# Patient Record
Sex: MALE | Race: WHITE | NOT HISPANIC OR LATINO | URBAN - METROPOLITAN AREA
[De-identification: names, ages, dates, MRNs, and addresses within clinical notes are randomized per-mention and may not be internally consistent; named-entity substitution may affect disease eponyms.]

---

## 2024-08-15 ENCOUNTER — EVALUATION (OUTPATIENT)
Dept: OCCUPATIONAL THERAPY | Facility: CLINIC | Age: 79
End: 2024-08-15
Payer: COMMERCIAL

## 2024-08-15 DIAGNOSIS — R29.898 BILATERAL LEG WEAKNESS: ICD-10-CM

## 2024-08-15 DIAGNOSIS — I89.0 LYMPHEDEMA OF BOTH LOWER EXTREMITIES: Primary | ICD-10-CM

## 2024-08-15 DIAGNOSIS — I89.0 LYMPHEDEMA, NOT ELSEWHERE CLASSIFIED: ICD-10-CM

## 2024-08-15 PROCEDURE — 97166 OT EVAL MOD COMPLEX 45 MIN: CPT | Mod: GO

## 2024-08-15 ASSESSMENT — ENCOUNTER SYMPTOMS
OCCASIONAL FEELINGS OF UNSTEADINESS: 1
LOSS OF SENSATION IN FEET: 1
DEPRESSION: 0

## 2024-08-15 ASSESSMENT — PAIN DESCRIPTION - DESCRIPTORS: DESCRIPTORS: ACHING;DISCOMFORT;HEAVINESS;TIGHTNESS

## 2024-08-15 ASSESSMENT — PAIN SCALES - GENERAL: PAINLEVEL_OUTOF10: 5 - MODERATE PAIN

## 2024-08-15 ASSESSMENT — PAIN - FUNCTIONAL ASSESSMENT: PAIN_FUNCTIONAL_ASSESSMENT: 0-10

## 2024-08-15 NOTE — PROGRESS NOTES
Occupational Therapy    Occupational Therapy Evaluation    Name: Dexter Avalos  MRN: [id number]  : 1945  Date: 08/15/24    Time Entry:                            Assessment:     Plan:       Subjective   Current Problem:  1. Lymphedema of both lower extremities        2. Lymphedema, not elsewhere classified  Referral to Occupational Therapy      3. Bilateral leg weakness          General:   OT Received On: 08/15/24  General  Reason for Referral: lymphedema  Referred By: BARBIE Sanchez  Past Medical History Relevant to Rehab: Pt living at [facility] and rehab after being in ER for a fall.  hx of ulcerations for several years, RLE worse than LLE  -has been admitted multiple times for chronic leg issues and hx of cellulitis.    Has been on antibiotics in the past IV and oral.  -used to see a wound doctor at [facility].   -has seen vascular medicine in the past  General Comment: VISIT 1, AUTH REQUIRED onset 2024 from [facility] dx  Precautions:  Precautions  [facility] Fall Risk Score (The score of 4 or more indicates an increased risk of falling): 8  Precautions Comment: falls risk,  Vital Signs:     Pain Assessment:  Pain Assessment  Pain Assessment: 0-10  0-10 (Numeric) Pain Score: 5 - Moderate pain  Pain Type: Chronic pain  Pain Location: Leg  Pain Orientation: Right, Left  Pain Descriptors: Aching, Discomfort, Heaviness, Tightness  Pain Frequency: Intermittent  Pain Onset: Ongoing    Objective   Cognition:     Cognition Test Scores:     Home Living:  Home Living  Type of Home: Skilled Nursing facility  Lives With: Alone  Home Adaptive Equipment: Walker rolling or standard, Wheelchair-manual  Home Layout: One level  Home Access: Level entry  Bathroom Shower/Tub: Walk-in shower  Bathroom Toilet: Handicapped height  Bathroom Equipment: Grab bars in shower, Shower chair without back  Home Living Comments: staff assist with ADLs  Prior Function Per Pt/Caregiver Report:  Prior Function Per Pt/Caregiver  Report  Level of [place]: Needs assistance with ADLs  Receives Help From: Personal care attendant  ADL Assistance: Needs assistance  Hand Dominance: Right  IADL History:     ADL:     Activity Tolerance:  Activity Tolerance  Endurance: Tolerates less than 10 min exercise with changes in vital signs  Mobility/Transfer: {Mobility/Transfer:63730}  Modalities:     Vision:{Vision:76777}  Sensation:     Strength:     Perception:     Coordination:     Hand Function:     Extremities:   and    Lymphedema Assessment    Lymphedema Assessments:  Lymphedema Assessments: Lower extremities  Right Upper Extremity:     Left Upper Extremity:     UE Skin Appearance/Condition and Girth:     Upper Extremity Evaluation:     Right Lower Extremity:  R Metatarsal (cm): 25 cm  R Heel Y Angle: 37 cm  R Ankle (cm): 31 cm  R 10 cm Above Ankle (cm): 35 cm  R 20 cm Above Ankle (cm): 44.5 cm  R 30 cm Above Ankle (cm): 48.5 cm  R 40 cm Above Ankle (cm): 47 cm  R Knee (cm): 50.5 cm  R 10 cm Above Knee (cm): 0 cm  R 20 cm Above Knee (cm): 0 cm  R 30 cm Above Knee (cm): 0 cm  R 40 cm Above Knee (cm): 0 cm  R 50 cm Above Knee (cm): 0 cm  Right lower extremity total: 318.5  Left Lower Extremity:  L Metatarsal (cm): 23.5 cm  L Heel Y Angle: 36 cm  L Ankle (cm): 28.6 cm  L 10 cm Above Ankle (cm): 34 cm  L 20 cm Above Ankle (cm): 43 cm  L 30 cm Above Ankle (cm): 47.5 cm  L 40 cm Above Ankle (cm): 46.4 cm  L Knee (cm): 47 cm  L 10 cm Above Knee (cm): 0 cm  L 20 cm Above Knee (cm): 0 cm  L 30 cm Above Knee (cm): 0 cm  L 40 cm Above Knee (cm): 0 cm  L 50 cm Above Knee (cm): 0 cm  Left Lower extremity total: 306  LE Skin Appearance/Condition and Girth:  Other (comment): R 45 cm  length, L 45 cm length  Lower Extremity Evaluation:     Head and Neck Measurements:     Head and Neck Appearance:     Trunk Skin Appearance/Condition and Girth:      Genital Skin Appearance/Condition and Girth:     Home Living:     ADL:     Prior Function:  Level of [place]:  Needs assistance with ADLs  Receives Help From: Personal care attendant  ADL Assistance: Needs assistance  Hand Dominance: Right  Pain Assessment:  Pain Assessment: 0-10  0-10 (Numeric) Pain Score: 5 - Moderate pain  Pain Type: Chronic pain  Pain Location: Leg  Pain Orientation: Right, Left  Pain Descriptors: Aching, Discomfort, Heaviness, Tightness  Pain Frequency: Intermittent  Pain Onset: Ongoing  Therapy Precautions:  STE[facility]I Fall Risk Score (The score of 4 or more indicates an increased risk of falling): 8  Precautions Comment: falls risk,      Outcome Measures:       OP EDUCATION:       Goals:

## 2024-08-16 NOTE — PROGRESS NOTES
Occupational Therapy    Occupational Therapy Evaluation    Name: Dexter Avalos  MRN: [id number]  : 1945  Date: 24    Time Entry:  Time Calculation  Start Time: 1006  Stop Time: 1055  Time Calculation (min): 49 min  OT Evaluation Time Entry  OT Evaluation (Moderate) Time Entry: 49                      Assessment:  Pt is 79  year old with secondary lymphedema stage 3 with BLEs. Pt resident at SNF with recent venous ulcers with wound care.  Wounds closed at time of eval.  PT wheelchair use in facility with ambulation.   FFW with staff assist in room, Pt reporting pain in BLEs , requires staff assist for ADLs.  I with feeding, mod A for transfer at time of eval.  Rec lymphedema therapy to address proper fitting garments, caregiver education and instruction for use of garments and home management of lymphedema condition, lymphedema pump,    OT Assessment  OT Assessment Results: Decreased functional mobility, Decreased ADL status, Decreased endurance  Plan:  Intervention plan includes: compression bandaging, education/instruction, garment measuring/fitting, home program, manual therapy, therapeutic activities, therapeutic exercises      Pt is phase 1 of secondary lymphedema stage 3, will require decongestive therapy and will order 2 [place] Strong legpiece medium tall, 2 farrow strong foot piece medium long 20-50 mmhg   Outpatient Plan  Frequency: 1x/week  Duration: 6 weeks  Rehab Potential: Good  Plan of Care Agreement: Patient    Subjective   Current Problem:  1. Lymphedema of both lower extremities  Follow Up In Occupational Therapy      2. Lymphedema, not elsewhere classified  Referral to Occupational Therapy    Follow Up In Occupational Therapy      3. Bilateral leg weakness  Follow Up In Occupational Therapy        General:   OT Received On: 08/15/24  General  Reason for Referral: lymphedema  Referred By: BARBIE Sanchez  Past Medical History Relevant to Rehab: Pt living at [facility] and rehab after  being in ER for a fall.  hx of ulcerations for several years, RLE worse than LLE  -has been admitted multiple times for chronic leg issues and hx of cellulitis.    Has been on antibiotics in the past IV and oral.  -used to see a wound doctor at [facility].   -has seen vascular medicine in the past  General Comment: VISIT 1, AUTH REQUIRED onset 4/8/2024 from [facility] dx  Precautions:  Precautions  [facility] Fall Risk Score (The score of 4 or more indicates an increased risk of falling): 8  Precautions Comment: falls risk,  Vital Signs:     Pain Assessment:  Pain Assessment  Pain Assessment: 0-10  0-10 (Numeric) Pain Score: 5 - Moderate pain  Pain Type: Chronic pain  Pain Location: Leg  Pain Orientation: Right, Left  Pain Descriptors: Aching, Discomfort, Heaviness, Tightness  Pain Frequency: Intermittent  Pain Onset: Ongoing    Objective   Cognition:     Cognition Test Scores:     Home Living:  Home Living  Type of Home: Skilled Nursing facility  Lives With: Alone  Home Adaptive Equipment: Walker rolling or standard, Wheelchair-manual  Home Layout: One level  Home Access: Level entry  Bathroom Shower/Tub: Walk-in shower  Bathroom Toilet: Handicapped height  Bathroom Equipment: Grab bars in shower, Shower chair without back  Home Living Comments: staff assist with ADLs  Prior Function Per Pt/Caregiver Report:  Prior Function Per Pt/Caregiver Report  Level of [place]: Needs assistance with ADLs, Needs assistance with functional transfers  Receives Help From:  (facilty staff)  ADL Assistance:  (Pt reporting staff set up and assist for falls prevention for bathing)  Leisure: social participation with family  Hand Dominance: Right  IADL History:     ADL:     Activity Tolerance:  Activity Tolerance  Endurance: Tolerates less than 10 min exercise with changes in vital signs     Extremities:  BLE ROM with stiffness due to wheelchair use and wound care   BLE MMT 3/5 as evidence by functional mobility     Lymphedema  Assessment    Lymphedema Assessments:   Right Lower Extremity Lymphedema  R Metatarsal (cm): 25 cm  R Heel Y Angle: 37 cm  R Ankle (cm): 31 cm  R 10 cm Above Ankle (cm): 35 cm  R 20 cm Above Ankle (cm): 44.5 cm  R 30 cm Above Ankle (cm): 48.5 cm  R 40 cm Above Ankle (cm): 47 cm  R Knee (cm): 50.5 cm  R 10 cm Above Knee (cm): 0 cm  R 20 cm Above Knee (cm): 0 cm  R 30 cm Above Knee (cm): 0 cm  R 40 cm Above Knee (cm): 0 cm  R 50 cm Above Knee (cm): 0 cm  Right lower extremity total: 318.5    L Metatarsal (cm): 23.5 cm  L Heel Y Angle: 36 cm  L Ankle (cm): 28.6 cm  L 10 cm Above Ankle (cm): 34 cm  L 20 cm Above Ankle (cm): 43 cm  L 30 cm Above Ankle (cm): 47.5 cm  L 40 cm Above Ankle (cm): 46.4 cm  L Knee (cm): 47 cm  L 10 cm Above Knee (cm): 0 cm  L 20 cm Above Knee (cm): 0 cm  L 30 cm Above Knee (cm): 0 cm  L 40 cm Above Knee (cm): 0 cm  L 50 cm Above Knee (cm): 0 cm  Left Lower extremity total: 306Left Lower extremity total: 306  Right lower extremity total: 318.5     Pt skin appearance positive for fibrosis, pitting, hemosiderin staining,  hyperpigmentation, hyperkeratosis, papillomas, + stemmer sign,       Outcome Measures:       OP EDUCATION:  Education  Individual(s) Educated: Patient  Education Provided: Lymphedema  Home Program: Handout issued  Risk and Benefits Discussed with Patient/Caregiver/Other: yes  Patient/Caregiver Demonstrated Understanding: yes  Plan of Care Discussed and Agreed Upon: yes  Patient Response to Education: Patient/Caregiver Verbalized Understanding of Information    Goals:  Active       Lymphedema       Pt will I perform skin care for infection prevention and integrity of skin        Start:  08/16/24    Expected End:  11/29/24            Pt and caregivers will demo I with donning/doffing medical compression garments and schedule of proper compression 100% of the time       Start:  08/16/24    Expected End:  11/29/24            Pt will be I with stating and demonstrating home exercise  program in order to improve patients ability to perform self-care, and/or leisure tasks.        Start:  08/16/24    Expected End:  11/29/24            Pt will demo 2-5%  cm  of volume reduction with BLE in order for proper fitting of medical compression garments and increase ease in transfers        Start:  08/16/24    Expected End:  11/29/24            Pt will improve functional transfers for increased I to assist with ADLs and garment and pump management.         Start:  08/16/24    Expected End:  11/29/24

## 2024-09-12 ENCOUNTER — APPOINTMENT (OUTPATIENT)
Dept: CARDIOLOGY | Facility: HOSPITAL | Age: 79
End: 2024-09-12
Payer: COMMERCIAL

## 2024-09-12 ENCOUNTER — APPOINTMENT (OUTPATIENT)
Dept: RADIOLOGY | Facility: HOSPITAL | Age: 79
End: 2024-09-12
Payer: COMMERCIAL

## 2024-09-12 ENCOUNTER — HOSPITAL ENCOUNTER (EMERGENCY)
Facility: HOSPITAL | Age: 79
Discharge: HOME | End: 2024-09-12
Payer: COMMERCIAL

## 2024-09-12 VITALS
RESPIRATION RATE: 18 BRPM | OXYGEN SATURATION: 98 % | TEMPERATURE: 97.8 F | SYSTOLIC BLOOD PRESSURE: 124 MMHG | WEIGHT: 296.4 LBS | DIASTOLIC BLOOD PRESSURE: 62 MMHG | HEART RATE: 87 BPM

## 2024-09-12 DIAGNOSIS — R53.1 WEAKNESS: Primary | ICD-10-CM

## 2024-09-12 LAB
ANION GAP SERPL CALC-SCNC: 8 MMOL/L
APPEARANCE UR: CLEAR
BASOPHILS # BLD AUTO: 0.04 X10*3/UL (ref 0–0.1)
BASOPHILS NFR BLD AUTO: 0.5 %
BILIRUB UR STRIP.AUTO-MCNC: NEGATIVE MG/DL
BUN SERPL-MCNC: 24 MG/DL (ref 8–25)
CALCIUM SERPL-MCNC: 8.6 MG/DL (ref 8.5–10.4)
CHLORIDE SERPL-SCNC: 104 MMOL/L (ref 97–107)
CO2 SERPL-SCNC: 28 MMOL/L (ref 24–31)
COLOR UR: COLORLESS
CREAT SERPL-MCNC: 1.4 MG/DL (ref 0.4–1.6)
EGFRCR SERPLBLD CKD-EPI 2021: 50 ML/MIN/1.73M*2
EOSINOPHIL # BLD AUTO: 0.12 X10*3/UL (ref 0–0.4)
EOSINOPHIL NFR BLD AUTO: 1.6 %
ERYTHROCYTE [DISTWIDTH] IN BLOOD BY AUTOMATED COUNT: 15.1 % (ref 11.5–14.5)
GLUCOSE SERPL-MCNC: 88 MG/DL (ref 65–99)
GLUCOSE UR STRIP.AUTO-MCNC: NORMAL MG/DL
HCT VFR BLD AUTO: 34 % (ref 41–52)
HGB BLD-MCNC: 10.9 G/DL (ref 13.5–17.5)
IMM GRANULOCYTES # BLD AUTO: 0.1 X10*3/UL (ref 0–0.5)
IMM GRANULOCYTES NFR BLD AUTO: 1.4 % (ref 0–0.9)
KETONES UR STRIP.AUTO-MCNC: NEGATIVE MG/DL
LEUKOCYTE ESTERASE UR QL STRIP.AUTO: NEGATIVE
LYMPHOCYTES # BLD AUTO: 0.92 X10*3/UL (ref 0.8–3)
LYMPHOCYTES NFR BLD AUTO: 12.4 %
MCH RBC QN AUTO: 32.2 PG (ref 26–34)
MCHC RBC AUTO-ENTMCNC: 32.1 G/DL (ref 32–36)
MCV RBC AUTO: 101 FL (ref 80–100)
MONOCYTES # BLD AUTO: 1.04 X10*3/UL (ref 0.05–0.8)
MONOCYTES NFR BLD AUTO: 14.1 %
NEUTROPHILS # BLD AUTO: 5.17 X10*3/UL (ref 1.6–5.5)
NEUTROPHILS NFR BLD AUTO: 70 %
NITRITE UR QL STRIP.AUTO: NEGATIVE
NRBC BLD-RTO: 0 /100 WBCS (ref 0–0)
PH UR STRIP.AUTO: 7 [PH]
PLATELET # BLD AUTO: 253 X10*3/UL (ref 150–450)
POTASSIUM SERPL-SCNC: 4 MMOL/L (ref 3.4–5.1)
PROT UR STRIP.AUTO-MCNC: NEGATIVE MG/DL
RBC # BLD AUTO: 3.38 X10*6/UL (ref 4.5–5.9)
RBC # UR STRIP.AUTO: NEGATIVE /UL
SARS-COV-2 RNA RESP QL NAA+PROBE: NOT DETECTED
SODIUM SERPL-SCNC: 140 MMOL/L (ref 133–145)
SP GR UR STRIP.AUTO: 1.01
UROBILINOGEN UR STRIP.AUTO-MCNC: NORMAL MG/DL
WBC # BLD AUTO: 7.4 X10*3/UL (ref 4.4–11.3)

## 2024-09-12 PROCEDURE — 70450 CT HEAD/BRAIN W/O DYE: CPT

## 2024-09-12 PROCEDURE — 80048 BASIC METABOLIC PNL TOTAL CA: CPT

## 2024-09-12 PROCEDURE — 99285 EMERGENCY DEPT VISIT HI MDM: CPT

## 2024-09-12 PROCEDURE — 70450 CT HEAD/BRAIN W/O DYE: CPT | Performed by: RADIOLOGY

## 2024-09-12 PROCEDURE — 87635 SARS-COV-2 COVID-19 AMP PRB: CPT

## 2024-09-12 PROCEDURE — 93005 ELECTROCARDIOGRAM TRACING: CPT

## 2024-09-12 PROCEDURE — 81003 URINALYSIS AUTO W/O SCOPE: CPT

## 2024-09-12 PROCEDURE — 85025 COMPLETE CBC W/AUTO DIFF WBC: CPT

## 2024-09-12 PROCEDURE — 36415 COLL VENOUS BLD VENIPUNCTURE: CPT

## 2024-09-12 ASSESSMENT — COLUMBIA-SUICIDE SEVERITY RATING SCALE - C-SSRS
1. IN THE PAST MONTH, HAVE YOU WISHED YOU WERE DEAD OR WISHED YOU COULD GO TO SLEEP AND NOT WAKE UP?: NO
6. HAVE YOU EVER DONE ANYTHING, STARTED TO DO ANYTHING, OR PREPARED TO DO ANYTHING TO END YOUR LIFE?: NO
2. HAVE YOU ACTUALLY HAD ANY THOUGHTS OF KILLING YOURSELF?: NO

## 2024-09-12 ASSESSMENT — PAIN SCALES - GENERAL: PAINLEVEL_OUTOF10: 0 - NO PAIN

## 2024-09-12 ASSESSMENT — PAIN - FUNCTIONAL ASSESSMENT: PAIN_FUNCTIONAL_ASSESSMENT: 0-10

## 2024-09-12 NOTE — ED PROVIDER NOTES
HPI   Chief Complaint   Patient presents with    Weakness, Gen     Facility called EMS with stroke like symptoms since this AM. Pt AxOx4 and ambulated for EMS appropriately. Pt denies all complaints at this time.        Patient is an 79-year-old male with past medical history of COPD, DANELLE, lymphedema, HTN, HLD presenting via EMS from [facility] with concerns for possible strokelike symptoms.  Report is that when the patient was awakened by nursing staff, he had a difficult time word finding.  That was the only symptoms the nursing facility had noted.  The patient states that he has been having difficulty word finding for several years.  He also endorses that he was groggy when he first woke up.  He states he has been having progressive memory deficits and is being evaluated for possible Alzheimer's.  States that he was able to get up and ambulate out of bed this morning.  Patient denies fevers, chills, cough, sore throat, runny nose, chest pain, shortness of breath, abdominal pain, nausea, vomiting, diarrhea or urinary complaints.              Patient History   Past Medical History:   Diagnosis Date    Anxiety     Cellulitis and abscess of lower extremity     COPD (chronic obstructive pulmonary disease) (Multi)     Depression     Hypertension     Obesity     DANELLE (obstructive sleep apnea)      History reviewed. No pertinent surgical history.  No family history on file.  Social History     Tobacco Use    Smoking status: Former     Current packs/day: 0.50     Types: Cigarettes    Smokeless tobacco: Never   Substance Use Topics    Alcohol use: Not Currently    Drug use: Not on file       Physical Exam   ED Triage Vitals   Temperature Heart Rate Respirations BP   09/12/24 1100 09/12/24 1100 09/12/24 1100 09/12/24 1105   36.6 °C (97.8 °F) 87 18 124/62      Pulse Ox Temp Source Heart Rate Source Patient Position   09/12/24 1105 09/12/24 1100 09/12/24 1100 --   98 % Oral Monitor       BP Location FiO2 (%)      -- --             Physical Exam  Vitals and nursing note reviewed.   Constitutional:       Appearance: He is well-developed.      Comments: Awake, laying in examination bed   HENT:      Head: Normocephalic and atraumatic.      Nose: Nose normal.      Mouth/Throat:      Mouth: Mucous membranes are moist.      Pharynx: Oropharynx is clear.   Eyes:      Extraocular Movements: Extraocular movements intact.      Conjunctiva/sclera: Conjunctivae normal.      Pupils: Pupils are equal, round, and reactive to light.   Cardiovascular:      Rate and Rhythm: Normal rate and regular rhythm.      Pulses: Normal pulses.      Heart sounds: Normal heart sounds. No murmur heard.  Pulmonary:      Effort: Pulmonary effort is normal. No respiratory distress.      Breath sounds: Normal breath sounds.   Abdominal:      General: Abdomen is flat.      Palpations: Abdomen is soft.      Tenderness: There is no abdominal tenderness.   Musculoskeletal:         General: Swelling present. Normal range of motion.      Cervical back: Normal range of motion and neck supple.      Comments: Swelling of bilateral lower extremities, wrapped with Ace wrap.  Consistent with lymphedema   Skin:     General: Skin is warm and dry.      Capillary Refill: Capillary refill takes less than 2 seconds.   Neurological:      General: No focal deficit present.      Mental Status: He is alert and oriented to person, place, and time.   Psychiatric:         Mood and Affect: Mood normal.         Behavior: Behavior normal.           ED Course & MDM   ED Course as of 09/12/24 1428   u Sep 12, 2024   1134 I personally reviewed and interpreted the EKG @1132: NSR 61, no appreciable ischemia, LAD, LAFB, RBBB, 1st deg AVB, prolonged Qtc 467, non-specific TW findings, and no prior EKG available for review. [BC]      ED Course User Index  [BC] Panda Ferrari MD         Diagnoses as of 09/12/24 1428   Weakness                 No data recorded     Ola Coma Scale Score:  15 (09/12/24 1105 : Norah Ontiveros RN)       NIH Stroke Scale: 0 (09/12/24 1105 : Norah Ontiveros RN)                   Medical Decision Making  Patient is an 79-year-old male with past medical history of COPD, DANELLE, lymphedema, HTN, HLD presenting via EMS from a long-term care facility with concerns for possible strokelike symptoms.  NIH of 0.  Test of skew is negative.  There is no cerebral ataxia present.  Lab work, urine, imaging ordered.  Condition considered include but are not limited to: electrolyte abnormality, UTI, intracranial pathology, tired from sleep.     Attending physician was available for consultation on this patient.  CBC is without leukocytosis but does show signs of anemia with hemoglobin 10.9.  BMP without significant [place] abnormality or renal impairment.  UA with micro is negative.  COVID is negative.  Head CT without acute intracranial pathology.    I believe this patient is at low risk for complication, and a disposition of discharge is acceptable.  Return to the Emergency Department if new or worsening symptoms including headache, fever, chills, chest pain, shortness of breath, syncope, near syncope, abdominal pain, nausea, vomiting,  diarrhea, or worsening pain.  Patient is agreeable to a disposition of discharge with follow-up with primary care in the next several days.  Patient will be transported back via ride services.      Portions of this note made with Dragon software, please be mindful of potential grammatical errors.        Labs Reviewed   CBC WITH AUTO DIFFERENTIAL - Abnormal       Result Value    WBC 7.4      nRBC 0.0      RBC 3.38 (*)     Hemoglobin 10.9 (*)     Hematocrit 34.0 (*)      (*)     MCH 32.2      MCHC 32.1      RDW 15.1 (*)     Platelets 253      Neutrophils % 70.0      Immature Granulocytes %, Automated 1.4 (*)     Lymphocytes % 12.4      Monocytes % 14.1      Eosinophils % 1.6      Basophils % 0.5      Neutrophils Absolute 5.17      Immature Granulocytes  Absolute, Automated 0.10      Lymphocytes Absolute 0.92      Monocytes Absolute 1.04 (*)     Eosinophils Absolute 0.12      Basophils Absolute 0.04     BASIC METABOLIC PANEL - Abnormal    Glucose 88      Sodium 140      Potassium 4.0      Chloride 104      Bicarbonate 28      Urea Nitrogen 24      Creatinine 1.40      eGFR 50 (*)     Calcium 8.6      Anion Gap 8     URINALYSIS WITH REFLEX CULTURE AND MICROSCOPIC - Abnormal    Color, Urine Colorless (*)     Appearance, Urine Clear      Specific Gravity, Urine 1.007      pH, Urine 7.0      Protein, Urine NEGATIVE      Glucose, Urine Normal      Blood, Urine NEGATIVE      Ketones, Urine NEGATIVE      Bilirubin, Urine NEGATIVE      Urobilinogen, Urine Normal      Nitrite, Urine NEGATIVE      Leukocyte Esterase, Urine NEGATIVE     SARS-COV-2 PCR - Normal    Coronavirus 2019, PCR Not Detected      Narrative:     This assay has received FDA Emergency Use Authorization (EUA) and is only authorized for the duration of time that circumstances exist to justify the authorization of the emergency use of in vitro diagnostic tests for the detection of SARS-CoV-2 virus and/or diagnosis of COVID-19 infection under section 564(b)(1) of the Act, 21 U.S.C. 360bbb-3(b)(1). This assay is an in vitro diagnostic nucleic acid amplification test for the qualitative detection of SARS-CoV-2 from nasopharyngeal specimens and has been validated for use at [facility]. Negative results do not preclude COVID-19 infections and should not be used as the sole basis for diagnosis, treatment, or other management decisions.     URINALYSIS WITH REFLEX CULTURE AND MICROSCOPIC    Narrative:     The following orders were created for panel order Urinalysis with Reflex Culture and Microscopic.  Procedure                               Abnormality         Status                     ---------                               -----------         ------                     Urinalysis with  Reflex C...[[id number]]  Abnormal            Final result               Extra Urine Gray Tube[[id number]]                                                         Please view results for these tests on the individual orders.   EXTRA URINE GRAY TUBE         CT head wo IV contrast   Final Result   No acute intracranial pathologic findings are identified.   Age-related findings are present.        MACRO:   none        Signed by: Quentin Hudson 9/12/2024 12:47 PM   Dictation workstation:   [id number]            Procedure  Procedures     Ky Coronel PA-C  09/12/24 1425

## 2024-09-13 LAB
ATRIAL RATE: 61 BPM
P AXIS: 37 DEGREES
P OFFSET: 153 MS
P ONSET: 119 MS
PR INTERVAL: 218 MS
Q ONSET: 228 MS
QRS COUNT: 10 BEATS
QRS DURATION: 144 MS
QT INTERVAL: 464 MS
QTC CALCULATION(BAZETT): 467 MS
QTC FREDERICIA: 466 MS
R AXIS: -51 DEGREES
T AXIS: -6 DEGREES
T OFFSET: 460 MS
VENTRICULAR RATE: 61 BPM

## 2024-09-16 ENCOUNTER — DOCUMENTATION (OUTPATIENT)
Dept: OCCUPATIONAL THERAPY | Facility: CLINIC | Age: 79
End: 2024-09-16
Payer: COMMERCIAL

## 2024-09-16 NOTE — PROGRESS NOTES
Occupational Therapy                 Therapy Communication Note    Patient Name: Dexter Avalos  MRN: [id number]  Department:   Room: Room/bed info not found  Today's Date: 9/16/2024     Discipline: Occupational Therapy    Phone call placed to SNF. Spoke with Nita PARIS for explanation of time frame for garments.  Potential self pay for lymphedema pumps due to insurance.  Pt did not communicate today appt with staff so appt not completed this date.      Pt will make next session per DON

## 2024-09-24 ENCOUNTER — TREATMENT (OUTPATIENT)
Dept: OCCUPATIONAL THERAPY | Facility: CLINIC | Age: 79
End: 2024-09-24
Payer: COMMERCIAL

## 2024-09-24 DIAGNOSIS — I89.0 LYMPHEDEMA OF BOTH LOWER EXTREMITIES: ICD-10-CM

## 2024-09-24 DIAGNOSIS — I89.0 LYMPHEDEMA, NOT ELSEWHERE CLASSIFIED: ICD-10-CM

## 2024-09-24 DIAGNOSIS — R29.898 BILATERAL LEG WEAKNESS: ICD-10-CM

## 2024-09-24 PROCEDURE — 97535 SELF CARE MNGMENT TRAINING: CPT | Mod: GO

## 2024-09-24 PROCEDURE — 97110 THERAPEUTIC EXERCISES: CPT | Mod: GO

## 2024-09-24 ASSESSMENT — ACTIVITIES OF DAILY LIVING (ADL): HOME_MANAGEMENT_TIME_ENTRY: 18

## 2024-09-24 NOTE — PROGRESS NOTES
Occupational Therapy    Occupational Therapy Treatment    Name: Dexter Avalos  MRN: [id number]  : 1945  Date: 24    Time Entry:  Time Calculation  Start Time:   Stop Time: 1551  Time Calculation (min): 59 min        OT Therapeutic Procedures Time Entry  Self Care/Home Management (ADLs) Time Entry: 18  Therapeutic Exercise Time Entry: 41                Assessment:  muscle fatigue, lymph flow        Plan: Continue with POC as tolerated, monitor home program, assess new garments,           Subjective   General:  OT Last Visit  OT Received On: 08/15/24  General  Reason for Referral: lymphedema  Referred By: BARBIE Sanchez  General Comment: VISIT 2/10 10V  OT APPROVED 08/15/2024-2024 onset 2024 from [facility] dx  Precautions:  Precautions Comment: falls risk,  Pain Assessment:       Objective            Therapy/Activity: Therapeutic Exercise  Therapeutic Exercise Performed: Yes  Therapeutic Exercise Activity 1: sit<>stand with standing support for balance support and increase muscle pumping  Therapeutic Exercise Activity 2: knee flexoin 20 reps  Therapeutic Exercise Activity 3: knee extension 20 reps  Therapeutic Exercise Activity 4: toe taps and heel raises 20 reps each  Therapeutic Exercise Activity 5: trunk twists 20 reps         Manual Therapy  Manual Therapy Performed: Yes  Manual Therapy Activity 1: girth measurements noted with no change     Lymphedema Assessment    Lymphedema Assessments:  Lymphedema Assessments: Lower extremities     Right Lower Extremity:  R Metatarsal (cm): 25 cm  R Heel Y Angle: 37 cm  R Ankle (cm): 31 cm  R 10 cm Above Ankle (cm): 35 cm  R 20 cm Above Ankle (cm): 44.5 cm  R 30 cm Above Ankle (cm): 48.5 cm  R 40 cm Above Ankle (cm): 47 cm  R Knee (cm): 50.5 cm  R 10 cm Above Knee (cm): 0 cm  R 20 cm Above Knee (cm): 0 cm  R 30 cm Above Knee (cm): 0 cm  R 40 cm Above Knee (cm): 0 cm  R 50 cm Above Knee (cm): 0 cm  Right lower extremity total: 318.5  Left Lower Extremity:  L  Metatarsal (cm): 23.5 cm  L Heel Y Angle: 36 cm  L Ankle (cm): 28.6 cm  L 10 cm Above Ankle (cm): 34 cm  L 20 cm Above Ankle (cm): 43 cm  L 30 cm Above Ankle (cm): 47.5 cm  L 40 cm Above Ankle (cm): 46.4 cm  L Knee (cm): 47 cm  L 10 cm Above Knee (cm): 0 cm  L 20 cm Above Knee (cm): 0 cm  L 30 cm Above Knee (cm): 0 cm  L 40 cm Above Knee (cm): 0 cm  L 50 cm Above Knee (cm): 0 cm  Left Lower extremity total: 306     Therapy Precautions:  Precautions Comment: falls risk,    OP EDUCATION:       Goals:  Active       Lymphedema       Pt will I perform skin care for infection prevention and integrity of skin        Start:  08/16/24    Expected End:  11/29/24            Pt and caregivers will demo I with donning/doffing medical compression garments and schedule of proper compression 100% of the time       Start:  08/16/24    Expected End:  11/29/24            Pt will be I with stating and demonstrating home exercise program in order to improve patients ability to perform self-care, and/or leisure tasks.        Start:  08/16/24    Expected End:  11/29/24            Pt will demo 2-5%  cm  of volume reduction with BLE in order for proper fitting of medical compression garments and increase ease in transfers        Start:  08/16/24    Expected End:  11/29/24            Pt will improve functional transfers for increased I to assist with ADLs and garment and pump management.         Start:  08/16/24    Expected End:  11/29/24

## 2024-10-08 ENCOUNTER — TREATMENT (OUTPATIENT)
Dept: OCCUPATIONAL THERAPY | Facility: CLINIC | Age: 79
End: 2024-10-08
Payer: COMMERCIAL

## 2024-10-08 DIAGNOSIS — I89.0 LYMPHEDEMA OF BOTH LOWER EXTREMITIES: ICD-10-CM

## 2024-10-08 DIAGNOSIS — I89.0 LYMPHEDEMA, NOT ELSEWHERE CLASSIFIED: ICD-10-CM

## 2024-10-08 DIAGNOSIS — R29.898 BILATERAL LEG WEAKNESS: ICD-10-CM

## 2024-10-08 PROCEDURE — 97140 MANUAL THERAPY 1/> REGIONS: CPT | Mod: GO

## 2024-10-08 PROCEDURE — 97530 THERAPEUTIC ACTIVITIES: CPT | Mod: GO,59

## 2024-10-08 ASSESSMENT — PAIN - FUNCTIONAL ASSESSMENT: PAIN_FUNCTIONAL_ASSESSMENT: 0-10

## 2024-10-08 ASSESSMENT — PAIN DESCRIPTION - DESCRIPTORS: DESCRIPTORS: ACHING;HEAVINESS

## 2024-10-08 ASSESSMENT — PAIN SCALES - GENERAL: PAINLEVEL_OUTOF10: 3

## 2024-10-09 NOTE — PROGRESS NOTES
Occupational Therapy    Occupational Therapy Treatment    Name: Dexter Avalos  MRN: [id number]  : 1945  Date: 10/09/24    Time Entry:  Time Calculation  Start Time: 1302  Stop Time: 1352  Time Calculation (min): 50 min        OT Therapeutic Procedures Time Entry  Manual Therapy Time Entry:   Therapeutic Activity Time Entry:                 Assessment:  pump trial, caregiver education      Plan: Continue with POC as tolerated, monitor home program, assess new garments,           Subjective   General:  OT Last Visit  OT Received On: 24  General  Reason for Referral: lymphedema  Referred By: BARBIE Sanchez  General Comment: VISIT 3/10 10V  OT APPROVED 08/15/2024-2024 onset 2024 from [facility] dx  Precautions:  Precautions Comment: falls risk, sc for ambulation,  Pain Assessment:  Pain Assessment  Pain Assessment: 0-10  0-10 (Numeric) Pain Score: 3  Pain Type: Chronic pain  Pain Location: Leg  Pain Orientation: Right, Left, Lower  Pain Descriptors: Aching, Heaviness  Pain Frequency: Intermittent  Pain Onset: Ongoing    Objective       Therapy/Activity:      Therapeutic Activity  Therapeutic Activity Performed: Yes  Therapeutic Activity 1: pump trial completed with assessment for proper mmhg.  Therapeutic Activity 2: caregiver from facility present with instruction and edu on use of pump and donning/doffing leg sleeves, orientation of BL legs during pump trial, proper cleaning for preventoin of infection.  pt and caregiver in agreement  Therapeutic Activity 3: Pt completed 4 weeks of conservative therapy with elevation, exercise, compression sleeve with persistent swelling noted this date.  rec lymphedema pump    Manual Therapy  Manual Therapy Performed: Yes  Manual Therapy Activity 1: girth measurements with no change this date.       Lymphedema Assessment    Lymphedema Assessments:  Lymphedema Assessments: Lower extremities  Right Upper Extremity:     Left Upper Extremity:     UE Skin  Appearance/Condition and Girth:     Upper Extremity Evaluation:     Right Lower Extremity:  R Metatarsal (cm): 25 cm  R Heel Y Angle: 37 cm  R Ankle (cm): 31 cm  R 10 cm Above Ankle (cm): 35 cm  R 20 cm Above Ankle (cm): 44.5 cm  R 30 cm Above Ankle (cm): 48.5 cm  R 40 cm Above Ankle (cm): 47 cm  R Knee (cm): 50.5 cm  R 10 cm Above Knee (cm): 0 cm  R 20 cm Above Knee (cm): 0 cm  R 30 cm Above Knee (cm): 0 cm  R 40 cm Above Knee (cm): 0 cm  R 50 cm Above Knee (cm): 0 cm  Right lower extremity total: 318.5  Left Lower Extremity:  L Metatarsal (cm): 23.5 cm  L Heel Y Angle: 36 cm  L Ankle (cm): 28.6 cm  L 10 cm Above Ankle (cm): 34 cm  L 20 cm Above Ankle (cm): 43 cm  L 30 cm Above Ankle (cm): 47.5 cm  L 40 cm Above Ankle (cm): 46.4 cm  L Knee (cm): 47 cm  L 10 cm Above Knee (cm): 0 cm  L 20 cm Above Knee (cm): 0 cm  L 30 cm Above Knee (cm): 0 cm  L 40 cm Above Knee (cm): 0 cm  L 50 cm Above Knee (cm): 0 cm  Left Lower extremity total: 306  LE Skin Appearance/Condition and Girth:  Dryness: Y  Edema - Fibrotic: Y  Edema - Pitting: Y  Hemosiderin Staining: Y  Hyperkeratosis: Y  Hyperpigmentation: Y  Papillomas: Y  +Stemmer Sign: Y     Pain Assessment:  Pain Assessment: 0-10  0-10 (Numeric) Pain Score: 3  Pain Type: Chronic pain  Pain Location: Leg  Pain Orientation: Right, Left, Lower  Pain Descriptors: Aching, Heaviness  Pain Frequency: Intermittent  Pain Onset: Ongoing  Therapy Precautions:  Precautions Comment: falls risk, sc for ambulation,      OP EDUCATION:       Goals:  Active       Lymphedema       Pt will I perform skin care for infection prevention and integrity of skin        Start:  08/16/24    Expected End:  11/29/24            Pt and caregivers will demo I with donning/doffing medical compression garments and schedule of proper compression 100% of the time       Start:  08/16/24    Expected End:  11/29/24            Pt will be I with stating and demonstrating home exercise program in order to improve  patients ability to perform self-care, and/or leisure tasks.        Start:  08/16/24    Expected End:  11/29/24            Pt will demo 2-5%  cm  of volume reduction with BLE in order for proper fitting of medical compression garments and increase ease in transfers        Start:  08/16/24    Expected End:  11/29/24            Pt will improve functional transfers for increased I to assist with ADLs and garment and pump management.         Start:  08/16/24    Expected End:  11/29/24

## 2024-11-13 ENCOUNTER — TREATMENT (OUTPATIENT)
Dept: OCCUPATIONAL THERAPY | Facility: CLINIC | Age: 79
End: 2024-11-13
Payer: COMMERCIAL

## 2024-11-13 DIAGNOSIS — R29.898 BILATERAL LEG WEAKNESS: ICD-10-CM

## 2024-11-13 DIAGNOSIS — I89.0 LYMPHEDEMA, NOT ELSEWHERE CLASSIFIED: ICD-10-CM

## 2024-11-13 DIAGNOSIS — I89.0 LYMPHEDEMA OF BOTH LOWER EXTREMITIES: ICD-10-CM

## 2024-11-13 PROCEDURE — 97110 THERAPEUTIC EXERCISES: CPT | Mod: GO

## 2024-11-13 PROCEDURE — 97140 MANUAL THERAPY 1/> REGIONS: CPT | Mod: GO

## 2024-11-13 PROCEDURE — 97535 SELF CARE MNGMENT TRAINING: CPT | Mod: GO

## 2024-11-13 ASSESSMENT — ACTIVITIES OF DAILY LIVING (ADL): HOME_MANAGEMENT_TIME_ENTRY: 14

## 2024-11-13 ASSESSMENT — PAIN - FUNCTIONAL ASSESSMENT: PAIN_FUNCTIONAL_ASSESSMENT: 0-10

## 2024-11-13 ASSESSMENT — PAIN SCALES - GENERAL: PAINLEVEL_OUTOF10: 0 - NO PAIN

## 2024-11-13 NOTE — PROGRESS NOTES
Occupational Therapy    Occupational Therapy Treatment    Name: Dexter Avalos  MRN: [id number]  : 1945  Date: 24    Time Entry:  Time Calculation  Start Time: 1502  Stop Time: 1556  Time Calculation (min): 54 min        OT Therapeutic Procedures Time Entry  Manual Therapy Time Entry: 26  Self Care/Home Management (ADLs) Time Entry: 14  Therapeutic Exercise Time Entry: 14                Assessment: muscle fatigue, increased volume noted      Plan:  Pt arrived with no medical compression garments this date.  Phone call placed this AM to Chaya to make sure garments came with pt.  Made second phone call this date when pt arrived. Left voicemail for pt to bring next session in order to advance progress with therapy        Subjective    General:  OT Last Visit  OT Received On: 10/08/24  General  Reason for Referral: lymphedema  Referred By: BARBIE Sanchez  General Comment: VISIT 4/10 10V  OT APPROVED 08/15/2024-2024 onset 2024 from [facility] dx  Precautions:  Precautions Comment: falls risk, sc for ambulation,  Pain Assessment:  Pain Assessment  Pain Assessment: 0-10  0-10 (Numeric) Pain Score: 0 - No pain    Objective       Therapy/Activity: Therapeutic Exercise  Therapeutic Exercise Performed: Yes  Therapeutic Exercise Activity 2: knee flexion 20 reps  Therapeutic Exercise Activity 3: knee extension 20 reps  Therapeutic Exercise Activity 4: heel hikes 20 reps  Therapeutic Exercise Activity 5: toe taps 20 reps    Therapeutic Activity  Therapeutic Activity Performed: Yes  Therapeutic Activity 1: pt completed toileting with ambulating into bathroom with BL canes for support.  pt able to complete on/off toilet with good balance support with use of grab bar.    Manual Therapy  Manual Therapy Performed: Yes  Manual Therapy Activity 1: girth measurements with volume increased noted     Lymphedema Assessment    Lymphedema Assessments:  Lymphedema Assessments: Lower extremities  Right Upper Extremity:     Left Upper  Extremity:     UE Skin Appearance/Condition and Girth:     Upper Extremity Evaluation:     Right Lower Extremity:  R Metatarsal (cm): 26 cm  R Heel Y Angle: 37 cm  R Ankle (cm): 31 cm  R 10 cm Above Ankle (cm): 36 cm  R 20 cm Above Ankle (cm): 45.5 cm  R 30 cm Above Ankle (cm): 50.5 cm  R 40 cm Above Ankle (cm): 51 cm  R Knee (cm): 52 cm  R 10 cm Above Knee (cm): 0 cm  R 20 cm Above Knee (cm): 0 cm  R 30 cm Above Knee (cm): 0 cm  R 40 cm Above Knee (cm): 0 cm  R 50 cm Above Knee (cm): 0 cm  Right lower extremity total: 329  Left Lower Extremity:  L Metatarsal (cm): 25.5 cm  L Heel Y Angle: 37 cm  L Ankle (cm): 28 cm  L 10 cm Above Ankle (cm): 34 cm  L 20 cm Above Ankle (cm): 44 cm  L 30 cm Above Ankle (cm): 48 cm  L 40 cm Above Ankle (cm): 48 cm  L Knee (cm): 51 cm  L 10 cm Above Knee (cm): 0 cm  L 20 cm Above Knee (cm): 0 cm  L 30 cm Above Knee (cm): 0 cm  L 40 cm Above Knee (cm): 0 cm  L 50 cm Above Knee (cm): 0 cm  Left Lower extremity total: 315.5     Pain Assessment:  Pain Assessment: 0-10  0-10 (Numeric) Pain Score: 0 - No pain  Therapy Precautions:  Precautions Comment: falls risk, sc for ambulation,  OP EDUCATION:       Goals:  Active       Lymphedema       Pt will I perform skin care for infection prevention and integrity of skin        Start:  08/16/24    Expected End:  11/29/24            Pt and caregivers will demo I with donning/doffing medical compression garments and schedule of proper compression 100% of the time       Start:  08/16/24    Expected End:  11/29/24            Pt will be I with stating and demonstrating home exercise program in order to improve patients ability to perform self-care, and/or leisure tasks.        Start:  08/16/24    Expected End:  11/29/24            Pt will demo 2-5%  cm  of volume reduction with BLE in order for proper fitting of medical compression garments and increase ease in transfers        Start:  08/16/24    Expected End:  11/29/24            Pt will improve  functional transfers for increased I to assist with ADLs and garment and pump management.         Start:  08/16/24    Expected End:  11/29/24

## 2024-11-15 ENCOUNTER — DOCUMENTATION (OUTPATIENT)
Dept: OCCUPATIONAL THERAPY | Facility: CLINIC | Age: 79
End: 2024-11-15
Payer: COMMERCIAL

## 2024-11-15 NOTE — PROGRESS NOTES
Occupational Therapy                 Therapy Communication Note    Patient Name: Dexter Avalos  MRN: [id number]  Department: [facility]  Room: Room/bed info not found  Today's Date: 11/15/2024     Discipline: Occupational Therapy      Comment:  Phone call place 11/13/2024 morning of pt tx to medical records, Chaya at  to remind facility to bring inelastic velcro garments to appt with staff to learn donning/doffing.   When pt arrived pt did not have garments and no staff present in tx

## 2024-11-15 NOTE — PROGRESS NOTES
Occupational Therapy                 Therapy Communication Note    Patient Name: Dexter Avalos  MRN: [id number]  Department: [facility]  Room: Room/bed info not found  Today's Date: 11/15/2024     Discipline: Occupational Therapy        Comment:  Phone call placed to [facility] arriola on behalf of Dexter and future appt.  With limited tx due to insurance. Pt will require to bring inelastic wraps next session with staff attending.  Otherwise pt will be sent home per okay from Frances Chirinos, manager.

## 2024-11-21 ENCOUNTER — DOCUMENTATION (OUTPATIENT)
Dept: OCCUPATIONAL THERAPY | Facility: CLINIC | Age: 79
End: 2024-11-21
Payer: COMMERCIAL

## 2024-11-21 NOTE — PROGRESS NOTES
Occupational Therapy                 Therapy Communication Note    Patient Name: Dexter Avalos  MRN: [id number]  Department:   Room: Room/bed info not found  Today's Date: 11/21/2024     Discipline: Occupational Therapy    Comment:  Phone call placed to Chaya from Medical records pertaining to received fax number.  Left voicemail for Chaya to please call and make 2-3 future appts as Pt does not have any future appts this date.      One of the appt he must bring in ordered inelastic reduction kits with a staff in attendance in order to be trained on donning/doffing.     Requested chaya to return phone call if any questions.

## 2024-12-09 ENCOUNTER — TREATMENT (OUTPATIENT)
Dept: OCCUPATIONAL THERAPY | Facility: CLINIC | Age: 79
End: 2024-12-09
Payer: COMMERCIAL

## 2024-12-09 DIAGNOSIS — I89.0 LYMPHEDEMA OF BOTH LOWER EXTREMITIES: ICD-10-CM

## 2024-12-09 DIAGNOSIS — I89.0 LYMPHEDEMA, NOT ELSEWHERE CLASSIFIED: ICD-10-CM

## 2024-12-09 DIAGNOSIS — R29.898 BILATERAL LEG WEAKNESS: ICD-10-CM

## 2024-12-09 PROCEDURE — 97140 MANUAL THERAPY 1/> REGIONS: CPT | Mod: GO

## 2024-12-09 PROCEDURE — 97535 SELF CARE MNGMENT TRAINING: CPT | Mod: GO

## 2024-12-09 ASSESSMENT — PAIN - FUNCTIONAL ASSESSMENT: PAIN_FUNCTIONAL_ASSESSMENT: 0-10

## 2024-12-09 ASSESSMENT — ACTIVITIES OF DAILY LIVING (ADL): HOME_MANAGEMENT_TIME_ENTRY: 41

## 2024-12-09 ASSESSMENT — PAIN SCALES - GENERAL: PAINLEVEL_OUTOF10: 0 - NO PAIN

## 2024-12-09 NOTE — PROGRESS NOTES
Occupational Therapy    Occupational Therapy Treatment    Name: Dexter Avalos  MRN: [id number]  : 1945  Date: 24    Time Entry:  Time Calculation  Start Time: 800  Stop Time: 905  Time Calculation (min): 65 min        OT Therapeutic Procedures Time Entry  Manual Therapy Time Entry: 24  Self Care/Home Management (ADLs) Time Entry: 41                Assessment:  garment fitting, edu to pt with poor carry over with verbalizing instructions back.       Plan: called [facility] to provide edu that instructions are with pt to be followed.  Faxed over information to  and [facility] this date.         Subjective   General:  OT Last Visit  OT Received On: 24  General  Reason for Referral: lymphedema  Referred By: BARBIE Sanchez  General Comment: VISIT 5/10 10  OT APPROVED 08/15/2024-2024 onset 2024 from [facility] dx  Precautions:  Precautions Comment: falls risk, sc for ambulation,  Pain Assessment:  Pain Assessment  Pain Assessment: 0-10  0-10 (Numeric) Pain Score: 0 - No pain    Objective       Therapy/Activity:      Therapeutic Activity  Therapeutic Activity Performed: Yes  Therapeutic Activity 1: completed wound care on L foot top of foot with mascerated skin on top of area.  applied gauze and kerlex  Therapeutic Activity 2: Pt arrived with medical compression inelastic garments with no staff from facility at meeting for training on donning/doffing and wear schedule. provided handout with written instructions for staff to follow. pt took with him    Manual Therapy  Manual Therapy Performed: Yes  Manual Therapy Activity 1: girth measurements with volume increased noted in BLEs.  L foot with continued weeping noted on top of foot     Lymphedema Assessment    Lymphedema Assessments:  Lymphedema Assessments: Lower extremities  Right Upper Extremity:     Left Upper Extremity:     UE Skin Appearance/Condition and Girth:     Upper Extremity Evaluation:     Right Lower Extremity:  R Metatarsal (cm): 27 cm  R Heel Y Angle:  39 cm  R Ankle (cm): 33 cm  R 10 cm Above Ankle (cm): 39 cm  R 20 cm Above Ankle (cm): 50 cm  R 30 cm Above Ankle (cm): 54 cm  R 40 cm Above Ankle (cm): 53 cm  R Knee (cm): 54.5 cm  R 10 cm Above Knee (cm): 0 cm  R 20 cm Above Knee (cm): 0 cm  R 30 cm Above Knee (cm): 0 cm  R 40 cm Above Knee (cm): 0 cm  R 50 cm Above Knee (cm): 0 cm  Right lower extremity total: 349.5  Left Lower Extremity:  L Metatarsal (cm): 27.5 cm  L Heel Y Angle: 40 cm  L Ankle (cm): 29.5 cm  L 10 cm Above Ankle (cm): 38.5 cm  L 20 cm Above Ankle (cm): 47 cm  L 30 cm Above Ankle (cm): 52 cm  L 40 cm Above Ankle (cm): 51 cm  L Knee (cm): 54 cm  L 10 cm Above Knee (cm): 0 cm  L 20 cm Above Knee (cm): 0 cm  L 30 cm Above Knee (cm): 0 cm  L 40 cm Above Knee (cm): 0 cm  L 50 cm Above Knee (cm): 0 cm  Left Lower extremity total: 339.5  LE Skin Appearance/Condition and Girth:  Edema - Fibrotic: Y  Edema - Pitting: Y  Hemosiderin Staining: Y  Hyperkeratosis: Y  Hyperpigmentation: Y  Papillomas: Y  +Stemmer Sign: Y  Trophic Changes: Y     Pain Assessment:  Pain Assessment: 0-10  0-10 (Numeric) Pain Score: 0 - No pain  Therapy Precautions:  Precautions Comment: falls risk, sc for ambulation,    OP EDUCATION:       Goals:  Active       Lymphedema       Pt will I perform skin care for infection prevention and integrity of skin  (Not Progressing)       Start:  08/16/24    Expected End:  12/20/24            Pt and caregivers will demo I with donning/doffing medical compression garments and schedule of proper compression 100% of the time (Not Progressing)       Start:  08/16/24    Expected End:  12/20/24            Pt will be I with stating and demonstrating home exercise program in order to improve patients ability to perform self-care, and/or leisure tasks.  (Progressing)       Start:  08/16/24    Expected End:  12/20/24            Pt will demo 2-5%  cm  of volume reduction with BLE in order for proper fitting of medical compression garments and increase  ease in transfers  (Not Progressing)       Start:  08/16/24    Expected End:  12/20/24            Pt will improve functional transfers for increased I to assist with ADLs and garment and pump management.   (Progressing)       Start:  08/16/24    Expected End:  12/20/24

## 2025-06-09 ENCOUNTER — APPOINTMENT (OUTPATIENT)
Dept: RADIOLOGY | Facility: HOSPITAL | Age: 80
DRG: 593 | End: 2025-06-09
Payer: COMMERCIAL

## 2025-06-09 ENCOUNTER — HOSPITAL ENCOUNTER (INPATIENT)
Facility: HOSPITAL | Age: 80
LOS: 4 days | Discharge: NURSING, REHAB OR LONG-TERM CARE | DRG: 593 | End: 2025-06-13
Attending: EMERGENCY MEDICINE | Admitting: INTERNAL MEDICINE
Payer: COMMERCIAL

## 2025-06-09 DIAGNOSIS — L03.115 CELLULITIS OF RIGHT LEG: ICD-10-CM

## 2025-06-09 DIAGNOSIS — S31.809A WOUND OF BUTTOCK, UNSPECIFIED LATERALITY, INITIAL ENCOUNTER: Primary | ICD-10-CM

## 2025-06-09 DIAGNOSIS — J41.8 MIXED SIMPLE AND MUCOPURULENT CHRONIC BRONCHITIS (MULTI): ICD-10-CM

## 2025-06-09 LAB
ALBUMIN SERPL BCP-MCNC: 3.3 G/DL (ref 3.4–5)
ALP SERPL-CCNC: 48 U/L (ref 33–136)
ALT SERPL W P-5'-P-CCNC: 6 U/L (ref 10–52)
ANION GAP SERPL CALCULATED.3IONS-SCNC: 10 MMOL/L (ref 10–20)
APPEARANCE UR: CLEAR
AST SERPL W P-5'-P-CCNC: 15 U/L (ref 9–39)
BASOPHILS # BLD AUTO: 0.03 X10*3/UL (ref 0–0.1)
BASOPHILS NFR BLD AUTO: 0.4 %
BILIRUB SERPL-MCNC: 0.4 MG/DL (ref 0–1.2)
BILIRUB UR STRIP.AUTO-MCNC: NEGATIVE MG/DL
BUN SERPL-MCNC: 26 MG/DL (ref 6–23)
CALCIUM SERPL-MCNC: 8.4 MG/DL (ref 8.6–10.3)
CHLORIDE SERPL-SCNC: 104 MMOL/L (ref 98–107)
CO2 SERPL-SCNC: 29 MMOL/L (ref 21–32)
COLOR UR: NORMAL
CREAT SERPL-MCNC: 1.33 MG/DL (ref 0.5–1.3)
EGFRCR SERPLBLD CKD-EPI 2021: 54 ML/MIN/1.73M*2
EOSINOPHIL # BLD AUTO: 0.17 X10*3/UL (ref 0–0.4)
EOSINOPHIL NFR BLD AUTO: 2.3 %
ERYTHROCYTE [DISTWIDTH] IN BLOOD BY AUTOMATED COUNT: 16.9 % (ref 11.5–14.5)
GLUCOSE SERPL-MCNC: 96 MG/DL (ref 74–99)
GLUCOSE UR STRIP.AUTO-MCNC: NORMAL MG/DL
HCT VFR BLD AUTO: 33.2 % (ref 41–52)
HGB BLD-MCNC: 10.4 G/DL (ref 13.5–17.5)
IMM GRANULOCYTES # BLD AUTO: 0.08 X10*3/UL (ref 0–0.5)
IMM GRANULOCYTES NFR BLD AUTO: 1.1 % (ref 0–0.9)
KETONES UR STRIP.AUTO-MCNC: NEGATIVE MG/DL
LACTATE SERPL-SCNC: 0.8 MMOL/L (ref 0.4–2)
LEUKOCYTE ESTERASE UR QL STRIP.AUTO: NEGATIVE
LYMPHOCYTES # BLD AUTO: 0.86 X10*3/UL (ref 0.8–3)
LYMPHOCYTES NFR BLD AUTO: 11.8 %
MCH RBC QN AUTO: 30.8 PG (ref 26–34)
MCHC RBC AUTO-ENTMCNC: 31.3 G/DL (ref 32–36)
MCV RBC AUTO: 98 FL (ref 80–100)
MONOCYTES # BLD AUTO: 0.83 X10*3/UL (ref 0.05–0.8)
MONOCYTES NFR BLD AUTO: 11.4 %
NEUTROPHILS # BLD AUTO: 5.32 X10*3/UL (ref 1.6–5.5)
NEUTROPHILS NFR BLD AUTO: 73 %
NITRITE UR QL STRIP.AUTO: NEGATIVE
NRBC BLD-RTO: 0 /100 WBCS (ref 0–0)
PH UR STRIP.AUTO: 7.5 [PH]
PLATELET # BLD AUTO: 281 X10*3/UL (ref 150–450)
POTASSIUM SERPL-SCNC: 3.7 MMOL/L (ref 3.5–5.3)
PROT SERPL-MCNC: 6.6 G/DL (ref 6.4–8.2)
PROT UR STRIP.AUTO-MCNC: NORMAL MG/DL
RBC # BLD AUTO: 3.38 X10*6/UL (ref 4.5–5.9)
RBC # UR STRIP.AUTO: NEGATIVE MG/DL
RBC #/AREA URNS AUTO: NORMAL /HPF
SODIUM SERPL-SCNC: 139 MMOL/L (ref 136–145)
SP GR UR STRIP.AUTO: 1.03
SQUAMOUS #/AREA URNS AUTO: NORMAL /HPF
UROBILINOGEN UR STRIP.AUTO-MCNC: NORMAL MG/DL
WBC # BLD AUTO: 7.3 X10*3/UL (ref 4.4–11.3)
WBC #/AREA URNS AUTO: NORMAL /HPF

## 2025-06-09 PROCEDURE — 85025 COMPLETE CBC W/AUTO DIFF WBC: CPT | Performed by: EMERGENCY MEDICINE

## 2025-06-09 PROCEDURE — 81001 URINALYSIS AUTO W/SCOPE: CPT | Performed by: INTERNAL MEDICINE

## 2025-06-09 PROCEDURE — 2550000001 HC RX 255 CONTRASTS: Performed by: EMERGENCY MEDICINE

## 2025-06-09 PROCEDURE — 83605 ASSAY OF LACTIC ACID: CPT | Performed by: EMERGENCY MEDICINE

## 2025-06-09 PROCEDURE — 2500000004 HC RX 250 GENERAL PHARMACY W/ HCPCS (ALT 636 FOR OP/ED): Performed by: INTERNAL MEDICINE

## 2025-06-09 PROCEDURE — 96366 THER/PROPH/DIAG IV INF ADDON: CPT

## 2025-06-09 PROCEDURE — 96368 THER/DIAG CONCURRENT INF: CPT

## 2025-06-09 PROCEDURE — 2500000004 HC RX 250 GENERAL PHARMACY W/ HCPCS (ALT 636 FOR OP/ED): Performed by: EMERGENCY MEDICINE

## 2025-06-09 PROCEDURE — 2500000001 HC RX 250 WO HCPCS SELF ADMINISTERED DRUGS (ALT 637 FOR MEDICARE OP): Performed by: INTERNAL MEDICINE

## 2025-06-09 PROCEDURE — 74177 CT ABD & PELVIS W/CONTRAST: CPT

## 2025-06-09 PROCEDURE — 36415 COLL VENOUS BLD VENIPUNCTURE: CPT | Performed by: EMERGENCY MEDICINE

## 2025-06-09 PROCEDURE — 99285 EMERGENCY DEPT VISIT HI MDM: CPT | Mod: 25 | Performed by: EMERGENCY MEDICINE

## 2025-06-09 PROCEDURE — 97161 PT EVAL LOW COMPLEX 20 MIN: CPT | Mod: GP

## 2025-06-09 PROCEDURE — 97165 OT EVAL LOW COMPLEX 30 MIN: CPT | Mod: GO

## 2025-06-09 PROCEDURE — 2500000002 HC RX 250 W HCPCS SELF ADMINISTERED DRUGS (ALT 637 FOR MEDICARE OP, ALT 636 FOR OP/ED): Performed by: INTERNAL MEDICINE

## 2025-06-09 PROCEDURE — 1100000001 HC PRIVATE ROOM DAILY

## 2025-06-09 PROCEDURE — 80053 COMPREHEN METABOLIC PANEL: CPT | Performed by: EMERGENCY MEDICINE

## 2025-06-09 PROCEDURE — 97530 THERAPEUTIC ACTIVITIES: CPT | Mod: GP

## 2025-06-09 PROCEDURE — 74177 CT ABD & PELVIS W/CONTRAST: CPT | Mod: FOREIGN READ | Performed by: RADIOLOGY

## 2025-06-09 PROCEDURE — 96365 THER/PROPH/DIAG IV INF INIT: CPT

## 2025-06-09 PROCEDURE — 96367 TX/PROPH/DG ADDL SEQ IV INF: CPT

## 2025-06-09 PROCEDURE — 87040 BLOOD CULTURE FOR BACTERIA: CPT | Mod: TRILAB | Performed by: EMERGENCY MEDICINE

## 2025-06-09 RX ORDER — NYSTATIN 100000 [USP'U]/G
1 POWDER TOPICAL 2 TIMES DAILY
Status: DISCONTINUED | OUTPATIENT
Start: 2025-06-09 | End: 2025-06-13 | Stop reason: HOSPADM

## 2025-06-09 RX ORDER — ACETAMINOPHEN 650 MG/1
650 SUPPOSITORY RECTAL EVERY 4 HOURS PRN
Status: DISCONTINUED | OUTPATIENT
Start: 2025-06-09 | End: 2025-06-09

## 2025-06-09 RX ORDER — BUPROPION HYDROCHLORIDE 100 MG/1
100 TABLET, EXTENDED RELEASE ORAL 2 TIMES DAILY
COMMUNITY

## 2025-06-09 RX ORDER — FUROSEMIDE 20 MG/1
20 TABLET ORAL
COMMUNITY

## 2025-06-09 RX ORDER — CEFEPIME HYDROCHLORIDE 2 G/50ML
2 INJECTION, SOLUTION INTRAVENOUS ONCE
Status: COMPLETED | OUTPATIENT
Start: 2025-06-09 | End: 2025-06-09

## 2025-06-09 RX ORDER — ONDANSETRON 8 MG/1
8 TABLET, FILM COATED ORAL EVERY 8 HOURS PRN
COMMUNITY

## 2025-06-09 RX ORDER — CEFEPIME HYDROCHLORIDE 2 G/50ML
2 INJECTION, SOLUTION INTRAVENOUS 3 TIMES DAILY
Status: DISCONTINUED | OUTPATIENT
Start: 2025-06-09 | End: 2025-06-12

## 2025-06-09 RX ORDER — PNV NO.95/FERROUS FUM/FOLIC AC 28MG-0.8MG
100 TABLET ORAL DAILY
COMMUNITY

## 2025-06-09 RX ORDER — OXYBUTYNIN CHLORIDE 5 MG/1
5 TABLET ORAL 2 TIMES DAILY
Status: DISCONTINUED | OUTPATIENT
Start: 2025-06-09 | End: 2025-06-13 | Stop reason: HOSPADM

## 2025-06-09 RX ORDER — HYDRALAZINE HYDROCHLORIDE 25 MG/1
25 TABLET, FILM COATED ORAL 3 TIMES DAILY
COMMUNITY

## 2025-06-09 RX ORDER — LORATADINE 10 MG
10 TABLET,DISINTEGRATING ORAL DAILY
COMMUNITY

## 2025-06-09 RX ORDER — POLYETHYLENE GLYCOL 3350 17 G/17G
17 POWDER, FOR SOLUTION ORAL DAILY
COMMUNITY

## 2025-06-09 RX ORDER — ADHESIVE BANDAGE
30 BANDAGE TOPICAL DAILY PRN
Status: DISCONTINUED | OUTPATIENT
Start: 2025-06-09 | End: 2025-06-13 | Stop reason: HOSPADM

## 2025-06-09 RX ORDER — ACETAMINOPHEN 160 MG/5ML
650 SOLUTION ORAL EVERY 4 HOURS PRN
Status: DISCONTINUED | OUTPATIENT
Start: 2025-06-09 | End: 2025-06-13 | Stop reason: HOSPADM

## 2025-06-09 RX ORDER — ACETAMINOPHEN 325 MG/1
650 TABLET ORAL EVERY 4 HOURS PRN
Status: DISCONTINUED | OUTPATIENT
Start: 2025-06-09 | End: 2025-06-13 | Stop reason: HOSPADM

## 2025-06-09 RX ORDER — ONDANSETRON 4 MG/1
4 TABLET, ORALLY DISINTEGRATING ORAL EVERY 8 HOURS PRN
Status: DISCONTINUED | OUTPATIENT
Start: 2025-06-09 | End: 2025-06-13 | Stop reason: HOSPADM

## 2025-06-09 RX ORDER — NYSTATIN 100000 [USP'U]/G
1 POWDER TOPICAL 2 TIMES DAILY
COMMUNITY

## 2025-06-09 RX ORDER — VANCOMYCIN 1.5 G/300ML
1500 INJECTION, SOLUTION INTRAVENOUS EVERY 24 HOURS
Status: DISCONTINUED | OUTPATIENT
Start: 2025-06-10 | End: 2025-06-10

## 2025-06-09 RX ORDER — VANCOMYCIN 2 G/400ML
2 INJECTION, SOLUTION INTRAVENOUS ONCE
Status: COMPLETED | OUTPATIENT
Start: 2025-06-09 | End: 2025-06-09

## 2025-06-09 RX ORDER — VIT C/E/ZN/COPPR/LUTEIN/ZEAXAN 250MG-90MG
50 CAPSULE ORAL DAILY
COMMUNITY

## 2025-06-09 RX ORDER — ONDANSETRON 4 MG/1
8 TABLET, FILM COATED ORAL EVERY 8 HOURS PRN
Status: DISCONTINUED | OUTPATIENT
Start: 2025-06-09 | End: 2025-06-09

## 2025-06-09 RX ORDER — TAMSULOSIN HYDROCHLORIDE 0.4 MG/1
0.4 CAPSULE ORAL DAILY
COMMUNITY

## 2025-06-09 RX ORDER — SODIUM CHLORIDE 9 MG/ML
50 INJECTION, SOLUTION INTRAVENOUS CONTINUOUS
Status: ACTIVE | OUTPATIENT
Start: 2025-06-09 | End: 2025-06-10

## 2025-06-09 RX ORDER — HYDRALAZINE HYDROCHLORIDE 25 MG/1
25 TABLET, FILM COATED ORAL 3 TIMES DAILY
Status: DISCONTINUED | OUTPATIENT
Start: 2025-06-09 | End: 2025-06-13 | Stop reason: HOSPADM

## 2025-06-09 RX ORDER — CHOLECALCIFEROL (VITAMIN D3) 50 MCG
50 TABLET ORAL DAILY
Status: DISCONTINUED | OUTPATIENT
Start: 2025-06-09 | End: 2025-06-13 | Stop reason: HOSPADM

## 2025-06-09 RX ORDER — CARVEDILOL 6.25 MG/1
6.25 TABLET ORAL 2 TIMES DAILY
COMMUNITY

## 2025-06-09 RX ORDER — PNV NO.95/FERROUS FUM/FOLIC AC 28MG-0.8MG
100 TABLET ORAL DAILY
Status: DISCONTINUED | OUTPATIENT
Start: 2025-06-09 | End: 2025-06-13 | Stop reason: HOSPADM

## 2025-06-09 RX ORDER — POLYETHYLENE GLYCOL 3350 17 G/17G
17 POWDER, FOR SOLUTION ORAL DAILY
Status: DISCONTINUED | OUTPATIENT
Start: 2025-06-09 | End: 2025-06-13 | Stop reason: HOSPADM

## 2025-06-09 RX ORDER — POTASSIUM CHLORIDE 750 MG/1
10 TABLET, FILM COATED, EXTENDED RELEASE ORAL DAILY
Status: DISCONTINUED | OUTPATIENT
Start: 2025-06-09 | End: 2025-06-13 | Stop reason: HOSPADM

## 2025-06-09 RX ORDER — CETIRIZINE HYDROCHLORIDE 10 MG/1
10 TABLET ORAL DAILY
Status: DISCONTINUED | OUTPATIENT
Start: 2025-06-09 | End: 2025-06-13 | Stop reason: HOSPADM

## 2025-06-09 RX ORDER — ACETAMINOPHEN 650 MG/1
325 SUPPOSITORY RECTAL EVERY 8 HOURS PRN
Status: DISCONTINUED | OUTPATIENT
Start: 2025-06-09 | End: 2025-06-13 | Stop reason: HOSPADM

## 2025-06-09 RX ORDER — METRONIDAZOLE 500 MG/100ML
500 INJECTION, SOLUTION INTRAVENOUS ONCE
Status: COMPLETED | OUTPATIENT
Start: 2025-06-09 | End: 2025-06-09

## 2025-06-09 RX ORDER — BUPROPION HYDROCHLORIDE 100 MG/1
100 TABLET, EXTENDED RELEASE ORAL 2 TIMES DAILY
Status: DISCONTINUED | OUTPATIENT
Start: 2025-06-09 | End: 2025-06-13 | Stop reason: HOSPADM

## 2025-06-09 RX ORDER — GUAIFENESIN 600 MG/1
600 TABLET, EXTENDED RELEASE ORAL EVERY 12 HOURS PRN
Status: DISCONTINUED | OUTPATIENT
Start: 2025-06-09 | End: 2025-06-13 | Stop reason: HOSPADM

## 2025-06-09 RX ORDER — TAMSULOSIN HYDROCHLORIDE 0.4 MG/1
0.4 CAPSULE ORAL DAILY
Status: DISCONTINUED | OUTPATIENT
Start: 2025-06-09 | End: 2025-06-13 | Stop reason: HOSPADM

## 2025-06-09 RX ORDER — ADHESIVE BANDAGE
30 BANDAGE TOPICAL DAILY PRN
COMMUNITY

## 2025-06-09 RX ORDER — ONDANSETRON HYDROCHLORIDE 2 MG/ML
4 INJECTION, SOLUTION INTRAVENOUS EVERY 8 HOURS PRN
Status: DISCONTINUED | OUTPATIENT
Start: 2025-06-09 | End: 2025-06-13 | Stop reason: HOSPADM

## 2025-06-09 RX ORDER — POLYETHYLENE GLYCOL 3350 17 G/17G
17 POWDER, FOR SOLUTION ORAL DAILY PRN
Status: DISCONTINUED | OUTPATIENT
Start: 2025-06-09 | End: 2025-06-13 | Stop reason: HOSPADM

## 2025-06-09 RX ORDER — POTASSIUM CHLORIDE 750 MG/1
10 TABLET, FILM COATED, EXTENDED RELEASE ORAL DAILY
COMMUNITY

## 2025-06-09 RX ORDER — VANCOMYCIN HYDROCHLORIDE 1 G/20ML
INJECTION, POWDER, LYOPHILIZED, FOR SOLUTION INTRAVENOUS DAILY PRN
Status: DISCONTINUED | OUTPATIENT
Start: 2025-06-09 | End: 2025-06-12

## 2025-06-09 RX ORDER — CARVEDILOL 6.25 MG/1
6.25 TABLET ORAL 2 TIMES DAILY
Status: DISCONTINUED | OUTPATIENT
Start: 2025-06-09 | End: 2025-06-13 | Stop reason: HOSPADM

## 2025-06-09 RX ORDER — OXYBUTYNIN CHLORIDE 10 MG/1
10 TABLET, EXTENDED RELEASE ORAL DAILY
COMMUNITY

## 2025-06-09 RX ORDER — GUAIFENESIN/DEXTROMETHORPHAN 100-10MG/5
5 SYRUP ORAL EVERY 4 HOURS PRN
Status: DISCONTINUED | OUTPATIENT
Start: 2025-06-09 | End: 2025-06-13 | Stop reason: HOSPADM

## 2025-06-09 RX ORDER — FUROSEMIDE 20 MG/1
20 TABLET ORAL
Status: DISCONTINUED | OUTPATIENT
Start: 2025-06-09 | End: 2025-06-13 | Stop reason: HOSPADM

## 2025-06-09 RX ADMIN — BUPROPION HYDROCHLORIDE 100 MG: 100 TABLET, EXTENDED RELEASE ORAL at 20:47

## 2025-06-09 RX ADMIN — NYSTATIN 1 APPLICATION: 100000 POWDER TOPICAL at 20:50

## 2025-06-09 RX ADMIN — HYDRALAZINE HYDROCHLORIDE 25 MG: 25 TABLET ORAL at 20:47

## 2025-06-09 RX ADMIN — CEFEPIME HYDROCHLORIDE 2 G: 2 INJECTION, SOLUTION INTRAVENOUS at 20:48

## 2025-06-09 RX ADMIN — CARVEDILOL 6.25 MG: 6.25 TABLET, FILM COATED ORAL at 20:47

## 2025-06-09 RX ADMIN — CEFEPIME HYDROCHLORIDE 2 G: 2 INJECTION, SOLUTION INTRAVENOUS at 05:10

## 2025-06-09 RX ADMIN — IOHEXOL 75 ML: 350 INJECTION, SOLUTION INTRAVENOUS at 06:47

## 2025-06-09 RX ADMIN — OXYBUTYNIN CHLORIDE 5 MG: 5 TABLET ORAL at 20:47

## 2025-06-09 RX ADMIN — VANCOMYCIN 2 G: 2 INJECTION, SOLUTION INTRAVENOUS at 05:40

## 2025-06-09 RX ADMIN — METRONIDAZOLE 500 MG: 500 INJECTION, SOLUTION INTRAVENOUS at 05:11

## 2025-06-09 RX ADMIN — CEFEPIME HYDROCHLORIDE 2 G: 2 INJECTION, SOLUTION INTRAVENOUS at 16:21

## 2025-06-09 RX ADMIN — FUROSEMIDE 20 MG: 20 TABLET ORAL at 17:29

## 2025-06-09 RX ADMIN — SODIUM CHLORIDE 50 ML/HR: 900 INJECTION, SOLUTION INTRAVENOUS at 05:10

## 2025-06-09 SDOH — ECONOMIC STABILITY: HOUSING INSECURITY: IN THE LAST 12 MONTHS, WAS THERE A TIME WHEN YOU WERE NOT ABLE TO PAY THE MORTGAGE OR RENT ON TIME?: NO

## 2025-06-09 SDOH — ECONOMIC STABILITY: HOUSING INSECURITY: AT ANY TIME IN THE PAST 12 MONTHS, WERE YOU HOMELESS OR LIVING IN A SHELTER (INCLUDING NOW)?: NO

## 2025-06-09 SDOH — SOCIAL STABILITY: SOCIAL INSECURITY: WITHIN THE LAST YEAR, HAVE YOU BEEN AFRAID OF YOUR PARTNER OR EX-PARTNER?: NO

## 2025-06-09 SDOH — SOCIAL STABILITY: SOCIAL INSECURITY: ARE YOU OR HAVE YOU BEEN THREATENED OR ABUSED PHYSICALLY, EMOTIONALLY, OR SEXUALLY BY ANYONE?: NO

## 2025-06-09 SDOH — SOCIAL STABILITY: SOCIAL INSECURITY: DO YOU FEEL UNSAFE GOING BACK TO THE PLACE WHERE YOU ARE LIVING?: NO

## 2025-06-09 SDOH — SOCIAL STABILITY: SOCIAL INSECURITY
WITHIN THE LAST YEAR, HAVE YOU BEEN RAPED OR FORCED TO HAVE ANY KIND OF SEXUAL ACTIVITY BY YOUR PARTNER OR EX-PARTNER?: NO

## 2025-06-09 SDOH — ECONOMIC STABILITY: FOOD INSECURITY: HOW HARD IS IT FOR YOU TO PAY FOR THE VERY BASICS LIKE FOOD, HOUSING, MEDICAL CARE, AND HEATING?: NOT VERY HARD

## 2025-06-09 SDOH — ECONOMIC STABILITY: INCOME INSECURITY: IN THE PAST 12 MONTHS HAS THE ELECTRIC, GAS, OIL, OR WATER COMPANY THREATENED TO SHUT OFF SERVICES IN YOUR HOME?: NO

## 2025-06-09 SDOH — HEALTH STABILITY: PHYSICAL HEALTH
HOW OFTEN DO YOU NEED TO HAVE SOMEONE HELP YOU WHEN YOU READ INSTRUCTIONS, PAMPHLETS, OR OTHER WRITTEN MATERIAL FROM YOUR DOCTOR OR PHARMACY?: SOMETIMES

## 2025-06-09 SDOH — SOCIAL STABILITY: SOCIAL INSECURITY: ARE THERE ANY APPARENT SIGNS OF INJURIES/BEHAVIORS THAT COULD BE RELATED TO ABUSE/NEGLECT?: NO

## 2025-06-09 SDOH — ECONOMIC STABILITY: FOOD INSECURITY: WITHIN THE PAST 12 MONTHS, YOU WORRIED THAT YOUR FOOD WOULD RUN OUT BEFORE YOU GOT THE MONEY TO BUY MORE.: NEVER TRUE

## 2025-06-09 SDOH — ECONOMIC STABILITY: FOOD INSECURITY: WITHIN THE PAST 12 MONTHS, THE FOOD YOU BOUGHT JUST DIDN'T LAST AND YOU DIDN'T HAVE MONEY TO GET MORE.: NEVER TRUE

## 2025-06-09 SDOH — SOCIAL STABILITY: SOCIAL INSECURITY
WITHIN THE LAST YEAR, HAVE YOU BEEN KICKED, HIT, SLAPPED, OR OTHERWISE PHYSICALLY HURT BY YOUR PARTNER OR EX-PARTNER?: NO

## 2025-06-09 SDOH — SOCIAL STABILITY: SOCIAL INSECURITY: WITHIN THE LAST YEAR, HAVE YOU BEEN HUMILIATED OR EMOTIONALLY ABUSED IN OTHER WAYS BY YOUR PARTNER OR EX-PARTNER?: NO

## 2025-06-09 SDOH — SOCIAL STABILITY: SOCIAL INSECURITY: DOES ANYONE TRY TO KEEP YOU FROM HAVING/CONTACTING OTHER FRIENDS OR DOING THINGS OUTSIDE YOUR HOME?: NO

## 2025-06-09 SDOH — ECONOMIC STABILITY: HOUSING INSECURITY: IN THE PAST 12 MONTHS, HOW MANY TIMES HAVE YOU MOVED WHERE YOU WERE LIVING?: 1

## 2025-06-09 SDOH — SOCIAL STABILITY: SOCIAL INSECURITY: HAS ANYONE EVER THREATENED TO HURT YOUR FAMILY OR YOUR PETS?: NO

## 2025-06-09 SDOH — SOCIAL STABILITY: SOCIAL INSECURITY: HAVE YOU HAD THOUGHTS OF HARMING ANYONE ELSE?: NO

## 2025-06-09 SDOH — SOCIAL STABILITY: SOCIAL INSECURITY: ABUSE: ADULT

## 2025-06-09 SDOH — SOCIAL STABILITY: SOCIAL INSECURITY: WERE YOU ABLE TO COMPLETE ALL THE BEHAVIORAL HEALTH SCREENINGS?: YES

## 2025-06-09 SDOH — ECONOMIC STABILITY: TRANSPORTATION INSECURITY: IN THE PAST 12 MONTHS, HAS LACK OF TRANSPORTATION KEPT YOU FROM MEDICAL APPOINTMENTS OR FROM GETTING MEDICATIONS?: NO

## 2025-06-09 SDOH — SOCIAL STABILITY: SOCIAL INSECURITY: DO YOU FEEL ANYONE HAS EXPLOITED OR TAKEN ADVANTAGE OF YOU FINANCIALLY OR OF YOUR PERSONAL PROPERTY?: NO

## 2025-06-09 ASSESSMENT — COGNITIVE AND FUNCTIONAL STATUS - GENERAL
DRESSING REGULAR UPPER BODY CLOTHING: A LOT
MOVING FROM LYING ON BACK TO SITTING ON SIDE OF FLAT BED WITH BEDRAILS: A LOT
MOVING FROM LYING ON BACK TO SITTING ON SIDE OF FLAT BED WITH BEDRAILS: A LITTLE
HELP NEEDED FOR BATHING: A LITTLE
DRESSING REGULAR UPPER BODY CLOTHING: A LITTLE
HELP NEEDED FOR BATHING: A LOT
MOVING TO AND FROM BED TO CHAIR: A LITTLE
DRESSING REGULAR UPPER BODY CLOTHING: A LITTLE
EATING MEALS: A LITTLE
DAILY ACTIVITIY SCORE: 18
MOVING TO AND FROM BED TO CHAIR: A LITTLE
STANDING UP FROM CHAIR USING ARMS: A LITTLE
STANDING UP FROM CHAIR USING ARMS: A LITTLE
HELP NEEDED FOR BATHING: A LITTLE
WALKING IN HOSPITAL ROOM: A LITTLE
MOVING FROM LYING ON BACK TO SITTING ON SIDE OF FLAT BED WITH BEDRAILS: A LITTLE
PERSONAL GROOMING: A LITTLE
TURNING FROM BACK TO SIDE WHILE IN FLAT BAD: A LITTLE
CLIMB 3 TO 5 STEPS WITH RAILING: A LOT
DAILY ACTIVITIY SCORE: 13
TURNING FROM BACK TO SIDE WHILE IN FLAT BAD: A LOT
PERSONAL GROOMING: A LITTLE
TOILETING: A LITTLE
DRESSING REGULAR LOWER BODY CLOTHING: A LITTLE
CLIMB 3 TO 5 STEPS WITH RAILING: TOTAL
STANDING UP FROM CHAIR USING ARMS: A LITTLE
DRESSING REGULAR LOWER BODY CLOTHING: A LOT
TURNING FROM BACK TO SIDE WHILE IN FLAT BAD: A LITTLE
WALKING IN HOSPITAL ROOM: A LOT
PERSONAL GROOMING: A LITTLE
MOBILITY SCORE: 17
MOBILITY SCORE: 13
WALKING IN HOSPITAL ROOM: A LITTLE
DRESSING REGULAR LOWER BODY CLOTHING: A LITTLE
MOBILITY SCORE: 17
MOVING TO AND FROM BED TO CHAIR: A LITTLE
TOILETING: TOTAL
CLIMB 3 TO 5 STEPS WITH RAILING: A LOT
TOILETING: A LITTLE
DAILY ACTIVITIY SCORE: 19
EATING MEALS: A LITTLE
PATIENT BASELINE BEDBOUND: NO

## 2025-06-09 ASSESSMENT — COLUMBIA-SUICIDE SEVERITY RATING SCALE - C-SSRS
6. HAVE YOU EVER DONE ANYTHING, STARTED TO DO ANYTHING, OR PREPARED TO DO ANYTHING TO END YOUR LIFE?: NO
2. HAVE YOU ACTUALLY HAD ANY THOUGHTS OF KILLING YOURSELF?: NO
1. IN THE PAST MONTH, HAVE YOU WISHED YOU WERE DEAD OR WISHED YOU COULD GO TO SLEEP AND NOT WAKE UP?: NO

## 2025-06-09 ASSESSMENT — ACTIVITIES OF DAILY LIVING (ADL)
LACK_OF_TRANSPORTATION: NO
PATIENT'S MEMORY ADEQUATE TO SAFELY COMPLETE DAILY ACTIVITIES?: YES
ADL_ASSISTANCE: NEEDS ASSISTANCE
FEEDING YOURSELF: INDEPENDENT
WALKS IN HOME: NEEDS ASSISTANCE
DRESSING YOURSELF: NEEDS ASSISTANCE
TOILETING: NEEDS ASSISTANCE
HEARING - LEFT EAR: FUNCTIONAL
JUDGMENT_ADEQUATE_SAFELY_COMPLETE_DAILY_ACTIVITIES: YES
LACK_OF_TRANSPORTATION: NO
BATHING: NEEDS ASSISTANCE
BATHING_ASSISTANCE: MODERATE
GROOMING: NEEDS ASSISTANCE
ASSISTIVE_DEVICE: WALKER
HEARING - RIGHT EAR: FUNCTIONAL
LACK_OF_TRANSPORTATION: NO
ADEQUATE_TO_COMPLETE_ADL: YES

## 2025-06-09 ASSESSMENT — ENCOUNTER SYMPTOMS
MUSCULOSKELETAL NEGATIVE: 1
EYES NEGATIVE: 1
CONSTITUTIONAL NEGATIVE: 1
PSYCHIATRIC NEGATIVE: 1
RESPIRATORY NEGATIVE: 1
NEUROLOGICAL NEGATIVE: 1
GASTROINTESTINAL NEGATIVE: 1
ENDOCRINE NEGATIVE: 1
CARDIOVASCULAR NEGATIVE: 1

## 2025-06-09 ASSESSMENT — LIFESTYLE VARIABLES
SKIP TO QUESTIONS 9-10: 1
AUDIT-C TOTAL SCORE: 0
AUDIT-C TOTAL SCORE: 0
HOW MANY STANDARD DRINKS CONTAINING ALCOHOL DO YOU HAVE ON A TYPICAL DAY: PATIENT DOES NOT DRINK
HOW OFTEN DO YOU HAVE 6 OR MORE DRINKS ON ONE OCCASION: NEVER
PRESCIPTION_ABUSE_PAST_12_MONTHS: NO
SUBSTANCE_ABUSE_PAST_12_MONTHS: NO
HOW OFTEN DO YOU HAVE A DRINK CONTAINING ALCOHOL: NEVER

## 2025-06-09 ASSESSMENT — PAIN SCALES - GENERAL
PAINLEVEL_OUTOF10: 0 - NO PAIN

## 2025-06-09 ASSESSMENT — PAIN - FUNCTIONAL ASSESSMENT
PAIN_FUNCTIONAL_ASSESSMENT: 0-10

## 2025-06-09 ASSESSMENT — PATIENT HEALTH QUESTIONNAIRE - PHQ9
1. LITTLE INTEREST OR PLEASURE IN DOING THINGS: NOT AT ALL
2. FEELING DOWN, DEPRESSED OR HOPELESS: NOT AT ALL
SUM OF ALL RESPONSES TO PHQ9 QUESTIONS 1 & 2: 0

## 2025-06-09 NOTE — CARE PLAN
The patient's goals for the shift include IV antibiotics    The clinical goals for the shift include IV antibiotics      Problem: Pain - Adult  Goal: Verbalizes/displays adequate comfort level or baseline comfort level  Outcome: Progressing     Problem: Safety - Adult  Goal: Free from fall injury  Outcome: Progressing     Problem: Discharge Planning  Goal: Discharge to home or other facility with appropriate resources  Outcome: Progressing     Problem: Chronic Conditions and Co-morbidities  Goal: Patient's chronic conditions and co-morbidity symptoms are monitored and maintained or improved  Outcome: Progressing     Problem: Skin  Goal: Decreased wound size/increased tissue granulation at next dressing change  Outcome: Progressing  Goal: Participates in plan/prevention/treatment measures  Outcome: Progressing  Goal: Prevent/manage excess moisture  Outcome: Progressing  Goal: Prevent/minimize sheer/friction injuries  Outcome: Progressing  Goal: Promote/optimize nutrition  Outcome: Progressing  Goal: Promote skin healing  6/9/2025 1611 by Erika Cordoba RN  Flowsheets (Taken 6/9/2025 1611)  Promote skin healing: Turn/reposition every 2 hours/use positioning/transfer devices  6/9/2025 1611 by Erika Cordoba RN  Outcome: Progressing  Flowsheets (Taken 6/9/2025 1611)  Promote skin healing: Turn/reposition every 2 hours/use positioning/transfer devices

## 2025-06-09 NOTE — CARE PLAN
The patient's goals for the shift include IV antibiotics    The clinical goals for the shift include IV fluids, IV antibiotics, safety, promote wound healing, and promote rest      06/09/25 at 7:47 PM - Diana Kebede RN

## 2025-06-09 NOTE — PROGRESS NOTES
Occupational Therapy    Evaluation    Patient Name: Dexter Avalos  MRN: [id number]  Department: [address]  Room: [address] [place] [address]  Today's Date: 6/9/2025  Time Calculation  Start Time: 1408  Stop Time: 1420  Time Calculation (min): 12 min    Assessment  IP OT Assessment  OT Assessment: Pt is 78 y/o male dmitted for wound of buttock. Pt presents w/ decreased ADL skills/ functional mobility, decreased strength, [id number] activity tolerance,decreased bvalance, impaired cognition, decreasedsafetya wareness, / judgement. REcommend OT services toa ddress above deficdits.  Prognosis: Fair  Barriers to Discharge Home: Physical needs  Physical Needs: 24hr ADL assistance needed, 24hr mobility assistance needed, Returning to long-term care/other facility  End of Session Communication: Bedside nurse  End of Session Patient Position: Bed, 3 rail up, Alarm on (set up for lunch)  Plan:  Treatment Interventions: ADL retraining, Functional transfer training, Endurance training, Patient/family training, Equipment evaluation/education, Compensatory technique education  OT Frequency: 3 times per week  OT Discharge Recommendations: Low intensity level of continued care, 24 hr supervision due to cognition  Equipment Recommended upon Discharge: Wheeled walker  OT Recommended Transfer Status: Assist of 1, Assistive equipment (Comment)  OT - OK to Discharge: Yes    Subjective   Current Problem:  1. Wound of buttock, unspecified laterality, initial encounter          OT Visit Info:  OT Received On: 06/09/25  General Visit Info:  General  Reason for Referral: decreased ADL's d/t wound of buttock  Referred By: Dr. Tom  Past Medical History Relevant to Rehab: Anxiety      Cellulitis and abscess of lower extremity     COPD (chronic obstructive pulmonary disease) (Multi)     Depression     Hypertension     Obesity     DANELLE (obstructive sleep apnea)  Family/Caregiver Present: No  Prior to Session Communication: Bedside nurse  Patient Position Received:  Bed, 4 rail up, Alarm on  Preferred Learning Style: verbal, visual  General Comment: Cleard to see for eval, pt agreeable to therapy  Precautions:  Medical Precautions: Fall precautions     Date/Time Vitals Session Patient Position Pulse Resp SpO2 BP MAP (mmHg)    06/09/25 1427 --  --  80  18  97 %  152/65  94                Pain:  Pain Assessment  Pain Assessment: 0-10  0-10 (Numeric) Pain Score: 0 - No pain    Objective   Cognition:  Overall Cognitive Status: Impaired  Cognition Comments: Some confusion  Memory: Exceptions to WFL  Long-Term Memory: Impaired  Short-Term Memory: Impaired  Safety/Judgement:  (decreased)  Insight: Moderate  Impulsive: Mildly  Processing Speed: Delayed           Home Living:  Type of Home: Long Term Care facility  Lives With:  (care staff)  Home Adaptive Equipment: Walker rolling or standard, Wheelchair-manual   Prior Function:  Level of [place]: Needs assistance with ADLs, Needs assistance with homemaking, Needs assistance with functional transfers  Receives Help From:  (care staff)  ADL Assistance: Needs assistance (assist from care staff for bathing, dressing)  Homemaking Assistance:  (facility provides)  Ambulatory Assistance:  (pt reports being in W/C during the day)  Hand Dominance: Right  IADL History:  IADL Comments: Facility provides  ADL:  Eating Assistance: Stand by  Eating Deficit: Setup (for lunch)  Grooming Assistance: Stand by  Grooming Deficit: Setup (per clinical judgement)  Bathing Assistance: Moderate  Bathing Deficit: Setup, Steadying, Increased time to complete  (per clinical judgement)  UE Dressing Assistance: Moderate  UE Dressing Deficit: Setup, Pull over head, Pull around back, Pull down in back (per clinical judgement)  LE Dressing Assistance: Maximal  LE Dressing Deficit:  (per clinical judgement)  Toileting Assistance with Device: Total  Toileting Deficit:  (per clinical judgement)  Functional Assistance: Not performed  Activity Tolerance:  Endurance:  Decreased tolerance for upright activites  Bed Mobility/Transfers: Bed Mobility  Bed Mobility: Yes  Bed Mobility 1  Bed Mobility 1: Sitting to supine  Level of Assistance 1: Minimum assistance  Bed Mobility Comments 1: HOB elev., assist for BLE's into bed    Transfers  Transfer: Yes  Transfer 1  Transfer From 1: Bed to  Transfer to 1: Stand  Technique 1: Sit to stand, Stand to sit  Transfer Device 1: Walker  Transfer Level of Assistance 1: Minimum assistance  Trials/Comments 1: Verbal cues for hand placement  Modalities:     IADL's:   IADL Comments: Facility provides  Vision: Vision - Basic Assessment  Current Vision: Wears glasses only for reading  Sensation:  Light Touch: No apparent deficits  Strength:  Strength Comments: LUE, 4/5, impa shld Rt, 3/5, rest 4/5  Perception:     Coordination:  Movements are Fluid and Coordinated: Yes   Hand Function:  Hand Function  Gross Grasp: Functional  Coordination: Functional  Extremities: RUE   RUE :  (imp shld, WFL rest) and LUE   LUE: Within Functional Limits      Outcome Measures: [facility] Daily Activity  Putting on and taking off regular lower body clothing: A lot  Bathing (including washing, rinsing, drying): A lot  Putting on and taking off regular upper body clothing: A lot  Toileting, which includes using toilet, bedpan or urinal: Total  Taking care of personal grooming such as brushing teeth: A little  Eating Meals: A little  Daily Activity - Total Score: 13      Education Documentation  ADL Training, taught by Allison Villegas OT at 6/9/2025  3:59 PM.  Learner: Patient  Readiness: Acceptance  Method: Explanation  Response: Needs Reinforcement  Comment: Pt educated in purpose of OT, POC    Education Comments  No comments found.      Goals:   Encounter Problems       Encounter Problems (Active)       OT Goals       ADL's (Progressing)       Start:  06/09/25    Expected End:  06/23/25       Patient will complete ADL tasks, with minimal assist  using AE need in order to  increase patient's safety and independence with self-care tasks.           Functional transfers (Progressing)       Start:  06/09/25    Expected End:  06/23/25       Patient will complete functional transfers with contact guard assist using least restrictive device, in order to increase patient's safety and independence with daily tasks.           Activity tolerance (Progressing)       Start:  06/09/25    Expected End:  06/23/25       Patient will demonstrate the ability to participate in functional activity at least >/= 25 minutes in order to increase patient's safety and independence with daily tasks.

## 2025-06-09 NOTE — CONSULTS
Inpatient consult to Infectious Diseases  Consult performed by: Yisel Lui, APRN-CNP  Consult ordered by: Vinh Tom MD  Reason for consult: Ulcers, cellulitis          Primary MD: Vinh Tom MD        History Of Present Illness  Dexter Avalos is a 79 y.o. male who presented to the emergency room with concern for infection.  Patient was seen and examined.  His sitting up in bed.  [facility].  He reports he has a groin fungal infection in skin folds and was concerned for infection due to worsening discomfort and bloody drainage.  He is afebrile, denies chills.  He denies shortness of breath at rest, he is on room air saturating 99%.  He denies nausea vomiting or diarrhea.  Bilateral skin folds under breast with erythema, bilateral groin skin folds with erythema, tenderness.  Chronic leg lymphedema, bilateral unna boot intact.   Picture in EPIC reviewed-right upper thigh erythema, ecchymosis.  Labs with no leukocytosis.  Blood cultures pending.  CT of abdomen and pelvis shows thickening skin along perineum and upper thighs, left buttock with concern for early subcutaneous abscess.  He has allergy to penicillin, sulfa.  He has a IV cefepime, vancomycin.       Past Medical History  He has a past medical history of Anxiety, Cellulitis and abscess of lower extremity, COPD (chronic obstructive pulmonary disease) (Multi), Depression, Hypertension, Obesity, and DANELLE (obstructive sleep apnea).    Surgical History  He has no past surgical history on file.     Social History     Occupational History    Not on file   Tobacco Use    Smoking status: Former     Current packs/day: 0.50     Types: Cigarettes    Smokeless tobacco: Never   Substance and Sexual Activity    Alcohol use: Not Currently    Drug use: Not on file    Sexual activity: Not Currently     Travel History   Travel since 05/09/25    No documented travel since 05/09/25              Family History  Family  "History[1]  Allergies  Amlodipine, Penicillin g, and Sulfa (sulfonamide antibiotics)     Immunization History   Administered Date(s) Administered    Pfizer COVID-19 vaccine, bivalent, age 12 years and older (30 mcg/0.3 mL) 10/05/2022    Pfizer Purple Cap SARS-CoV-2 02/24/2021, 03/17/2021, 10/13/2021     Medications  Home medications:  Prescriptions Prior to Admission[2]  Current medications:  Scheduled medications  Scheduled Medications[3]  Continuous medications  Continuous Medications[4]  PRN medications  PRN Medications[5]    Review of Systems   Constitutional: Negative.    HENT: Negative.     Eyes: Negative.    Respiratory: Negative.     Cardiovascular: Negative.    Gastrointestinal: Negative.    Endocrine: Negative.    Genitourinary: Negative.    Musculoskeletal: Negative.    Skin:  Positive for rash.   Neurological: Negative.    Psychiatric/Behavioral: Negative.          Objective  Range of Vitals (last 24 hours)  Heart Rate:  [7-87]   Temp:  [36.7 °C (98.1 °F)]   Resp:  [15-18]   BP: (123-158)/()   Height:  [177.8 cm (5' 10\")]   Weight:  [134 kg (296 lb)]   SpO2:  [83 %-100 %]   Daily Weight  06/09/25 : 134 kg (296 lb)    Body mass index is 42.47 kg/m².     Physical Exam  Constitutional:       Appearance: Normal appearance.   HENT:      Head: Normocephalic and atraumatic.      Nose: Nose normal.      Mouth/Throat:      Mouth: Mucous membranes are moist.      Pharynx: Oropharynx is clear.   Eyes:      General: No scleral icterus.  Cardiovascular:      Rate and Rhythm: Normal rate and regular rhythm.   Pulmonary:      Effort: Pulmonary effort is normal.      Breath sounds: Normal breath sounds.   Abdominal:      General: Bowel sounds are normal.      Palpations: Abdomen is soft.   Musculoskeletal:         General: Normal range of motion.      Cervical back: Normal range of motion and neck supple.   Skin:     General: Skin is warm and dry.      Comments: Intertrigo bilateral under breast, bilateral groin, " "bilateral lower extremity unna boots intact-exam deferred until removal    Neurological:      General: No focal deficit present.      Mental Status: He is alert and oriented to person, place, and time. Mental status is at baseline.   Psychiatric:         Mood and Affect: Mood normal.         Behavior: Behavior normal.          Relevant Results  Outside Hospital Results  No  Labs  Results from last 72 hours   Lab Units 06/09/25  0500   WBC AUTO x10*3/uL 7.3   HEMOGLOBIN g/dL 10.4*   HEMATOCRIT % 33.2*   PLATELETS AUTO x10*3/uL 281   NEUTROS PCT AUTO % 73.0   LYMPHS PCT AUTO % 11.8   MONOS PCT AUTO % 11.4   EOS PCT AUTO % 2.3     Results from last 72 hours   Lab Units 06/09/25  0500   SODIUM mmol/L 139   POTASSIUM mmol/L 3.7   CHLORIDE mmol/L 104   CO2 mmol/L 29   BUN mg/dL 26*   CREATININE mg/dL 1.33*   GLUCOSE mg/dL 96   CALCIUM mg/dL 8.4*   ANION GAP mmol/L 10   EGFR mL/min/1.73m*2 54*     Results from last 72 hours   Lab Units 06/09/25  0500   ALK PHOS U/L 48   BILIRUBIN TOTAL mg/dL 0.4   PROTEIN TOTAL g/dL 6.6   ALT U/L 6*   AST U/L 15   ALBUMIN g/dL 3.3*     Estimated Creatinine Clearance: 60 mL/min (A) (by C-G formula based on SCr of 1.33 mg/dL (H)).  No results found for: \"CRP\", \"SEDRATE\"  No results found for: \"HIV1X2\", \"HIVCONF\", \"DLTTXY7PK\"  No results found for: \"HEPCABINIT\", \"HEPCAB\", \"HCVPCRQUANT\"  Microbiology  Blood culture pending         Imaging  CT abdomen pelvis w IV contrast  Result Date: 6/9/2025  STUDY: CT Abdomen and Pelvis with IV Contrast; 6/09/2025 6:51 AM INDICATION: Perineal pressure wound. COMPARISON: CT AP 5/22/2022. ACCESSION NUMBER(S): [id number] ORDERING CLINICIAN: NIKKI JACOBSEN TECHNIQUE: CT of the abdomen and pelvis was performed.  Contiguous axial images were obtained at 3 mm slice thickness through the abdomen and pelvis. Coronal and sagittal reconstructions at 3 mm slice thickness were performed.  Omnipaque 350 75 mL was administered intravenously.  FINDINGS: Abdomen: The lung " bases are clear.  The liver is normal.  The pancreas, spleen, adrenal glands, and kidneys demonstrate no acute pathology. A few simple appearing right renal cysts measure up to 3.6 cm.  Simple appearing 5 cm left renal cyst. There is no free air or lymph node enlargement.  Abdominal aorta is not aneurysmal.  Small fat-containing umbilical hernia defect. Pelvis: There is no bowel wall thickening or obstruction.  Large overall stool burden.  There is no free fluid.  Lymph nodes are not enlarged. Urinary bladder is unremarkable. Thickened skin of the perineum and upper, medial thighs.  Along the inferior margin of the left buttock there is a 2 cm area of focal fat stranding and likely trace fluid.  Best seen on axial image 161 and coronal image 107. Skeleton:  There are no acute fractures.  No suspicious bony lesions.  Moderate to severe degenerative changes of the lumbar spine.    Thickened skin along the perineum and medial portions of both upper thighs adjusting possible cellulitis.  A 2 cm area along the inferior margin of the left buttock suggests possible very early subcutaneous abscess formation. No concerning findings within the abdomen or pelvis. Signed by Malik Rothman MD       Assessment/Plan   Bilateral groin Cellulitis   Intertrigo-skin folds-risk factor for cellulitis   Abnormal CT-left buttock possible early abscess formation   Bilateral lymphedema-will need Unna boot removed, assess lower extremity tomorrow-patient wanted to wait   Allergy penicillin, sulfa-tolerating cefepime      IV Cefepime  IV vancomycin-monitor levels  Nystatin powder   Follow up Blood cultures  Local care  Leg elevation   Further recommendation based on pending work up     LILY Hung-CNP         [1] No family history on file.  [2]   Medications Prior to Admission   Medication Sig Dispense Refill Last Dose/Taking    acetaminophen (Tylenol) 325 mg suppository Insert 1 suppository (325 mg) into the rectum every 8 hours  if needed for mild pain (1 - 3) or fever (temp greater than 38.0 C).       buPROPion SR (Wellbutrin SR) 100 mg 12 hr tablet Take 1 tablet (100 mg) by mouth 2 times a day. Do not crush, chew, or split.       carvedilol (Coreg) 6.25 mg tablet Take 1 tablet (6.25 mg) by mouth 2 times a day.       cholecalciferol (Vitamin D-3) 25 mcg (1,000 units) capsule Take 2 capsules (50 mcg) by mouth once daily.       cyanocobalamin (Vitamin B-12) 100 mcg tablet Take 1 tablet (100 mcg) by mouth once daily.       furosemide (Lasix) 20 mg tablet Take 1 tablet (20 mg) by mouth 3 times daily (morning, midday, late afternoon).       hydrALAZINE (Apresoline) 25 mg tablet Take 1 tablet (25 mg) by mouth 3 times a day.       loratadine (Claritin Reditabs) 10 mg disintegrating tablet Dissolve 1 tablet (10 mg) in the mouth once daily.       magnesium hydroxide (Milk of Magnesia) 400 mg/5 mL suspension Take 30 mL by mouth once daily as needed for constipation.       nystatin (Mycostatin) 100,000 unit/gram powder Apply 1 Application topically 2 times a day.       ondansetron (Zofran) 8 mg tablet Take 1 tablet (8 mg) by mouth every 8 hours if needed for nausea or vomiting.       oxyBUTYnin XL (Ditropan-XL) 10 mg 24 hr tablet Take 1 tablet (10 mg) by mouth once daily. Do not crush, chew, or split. Take at same time each day.       polyethylene glycol (Glycolax, Miralax) 17 gram packet Take 17 g by mouth once daily.       potassium chloride CR 10 mEq ER tablet Take 1 tablet (10 mEq) by mouth once daily. Do not crush, chew, or split.       tamsulosin (Flomax) 0.4 mg 24 hr capsule Take 1 capsule (0.4 mg) by mouth once daily. Do not crush, chew, or split.      [3] cefepime, 2 g, intravenous, TID  [START ON 6/10/2025] vancomycin, 1,500 mg, intravenous, q24h    [4] sodium chloride 0.9%, 50 mL/hr, Last Rate: 50 mL/hr (06/09/25 0510)    [5] PRN medications: acetaminophen **OR** acetaminophen **OR** acetaminophen, benzocaine-menthol,  dextromethorphan-guaifenesin, guaiFENesin, ondansetron ODT **OR** ondansetron, polyethylene glycol, vancomycin

## 2025-06-09 NOTE — PROGRESS NOTES
Physical Therapy Evaluation & Treatment    Patient Name: Dexter Avalos  MRN: [id number]  Department: [address]  Room: [address] [place] [address]  Today's Date: 6/9/2025   Time Calculation  Start Time: 1344  Stop Time: 1410  Time Calculation (min): 26 min    Assessment/Plan   PT Assessment  PT Assessment Results: Decreased strength, Decreased endurance, Impaired balance, Decreased mobility, Decreased coordination, Decreased cognition, Impaired judgement, Decreased safety awareness, Obesity, Decreased skin integrity, Pain  Rehab Prognosis: Fair  Barriers to Discharge Home: Physical needs  Physical Needs: 24hr mobility assistance needed, 24hr ADL assistance needed, Returning to long-term care/other facility  Evaluation/Treatment Tolerance: Patient tolerated treatment well  Medical Staff Made Aware: Yes  Strengths: Support of Caregivers  Barriers to Participation: Comorbidities, Insight into problems  End of Session Communication: Bedside nurse  Assessment Comment: 79 year old male admits from LTC with buttock wound. On eval, he demonstrates weakness warranting skilled PT care. At DC, low intensity rehab with continued 24/7 LTC recommended at [place].  End of Session Patient Position:  (Sitting at EOB with OT in room)   IP OR SWING BED PT PLAN  Inpatient or Swing Bed: Inpatient  PT Plan  Treatment/Interventions: Bed mobility, Transfer training, Gait training, Balance training, Strengthening, Endurance training, Therapeutic exercise, Therapeutic activity, Home exercise program, Positioning, Postural re-education  PT Plan: Ongoing PT  PT Frequency: 3 times per week  PT Discharge Recommendations: Low intensity level of continued care  Equipment Recommended upon Discharge: Wheeled walker  PT Recommended Transfer Status: Assist x1, Assistive device  PT - OK to Discharge: Yes      Subjective     PT Visit Info:  PT Received On: 06/09/25  General Visit Information:  General  Reason for Referral: Impaired Mobility-Buttock Wound  Referred By:   "Vaishali  Past Medical History Relevant to Rehab: Anxiety      Cellulitis and abscess of lower extremity     COPD (chronic obstructive pulmonary disease) (Multi)     Depression     Hypertension     Obesity     DANELLE (obstructive sleep apnea)  Family/Caregiver Present: No  Prior to Session Communication: Bedside nurse  Patient Position Received: Bed, 4 rail up, Alarm on  Preferred Learning Style: verbal, visual  General Comment: 79 year old male admits from LTC facility with buttock wound.  Home Living:  Home Living  Type of Home: Long Term Care facility  Lives With:  (staff)  Home Adaptive Equipment: Walker rolling or standard, Wheelchair-manual  Prior Level of Function:  Prior Function Per Pt/Caregiver Report  Level of [place]: Needs assistance with ADLs, Needs assistance with homemaking, Needs assistance with functional transfers  Receives Help From:  (staff)  ADL Assistance:  (assist from staff)  Homemaking Assistance:  (staff complete)  Ambulatory Assistance:  (Poor hx. Reports \"I sit in my WC all day\" but then reports ind with transfers and short distance walking with rollator? Denies fall hx)  Precautions:  Precautions  Medical Precautions: Fall precautions     Date/Time Vitals Session Patient Position Pulse Resp SpO2 BP MAP (mmHg)    06/09/25 1427 --  --  80  18  97 %  152/65  94                Objective   Pain:  Pain Assessment  Pain Assessment: 0-10  0-10 (Numeric) Pain Score: 0 - No pain  Cognition:  Cognition  Overall Cognitive Status: Impaired  Cognition Comments: Mildly confused? Repeats questions several times, limited safety insight  Memory: Exceptions to WFL  Long-Term Memory: Impaired  Short-Term Memory: Impaired  Insight: Moderate  Impulsive: Mildly  Processing Speed: Delayed    General Assessments:  Activity Tolerance  Endurance: Decreased tolerance for upright activites    Sensation  Light Touch: No apparent deficits    Strength  Strength Comments: BLE grossly 4/5 on MMT  Functional " Assessments:  Bed Mobility  Bed Mobility: Yes  Bed Mobility 1  Bed Mobility 1: Supine to sitting  Level of Assistance 1: Close supervision  Bed Mobility Comments 1: With HOB significantly elevated, PT is able to cue Pt through sequencing to self complete transfer to EOB. Very laborous with increased time and effort needed    Transfers  Transfer: Yes  Transfer 1  Transfer From 1: Bed to  Transfer to 1: Stand  Technique 1: Stand to sit, Sit to stand  Transfer Device 1: Walker  Transfer Level of Assistance 1: Contact guard  Trials/Comments 1: Cues on hand placement and AD use. Cues on setup. CGA for safety as Pt is mildly unsteady during uprighting. Discussed Rollator vs FWW vs B cane use (Pt reports intermittently using B canes for transfers/walking). Discussed the benefits to FWW for now    Ambulation/Gait Training  Ambulation/Gait Training Performed: Yes  Ambulation/Gait Training 1  Surface 1: Level tile  Device 1: Rolling walker  Assistance 1: Contact guard  Comments/Distance (ft) 1: 3' sideways along EOB: Cues on keeping walker close at all times. CGA for safety    Treatments:  Bed Mobility  Bed Mobility: Yes  Bed Mobility 1  Bed Mobility 1: Supine to sitting  Level of Assistance 1: Close supervision  Bed Mobility Comments 1: With HOB significantly elevated, PT is able to cue Pt through sequencing to self complete transfer to EOB. Very laborous with increased time and effort needed    Transfers  Transfer: Yes  Transfer 1  Transfer From 1: Bed to  Transfer to 1: Stand  Technique 1: Stand to sit, Sit to stand  Transfer Device 1: Walker  Transfer Level of Assistance 1: Contact guard  Trials/Comments 1: Cues on hand placement and AD use. Cues on setup. CGA for safety as Pt is mildly unsteady during uprighting. Discussed Rollator vs FWW vs B cane use (Pt reports intermittently using B canes for transfers/walking). Discussed the benefits to FWW for now    Education as below    Tolerates sitting at EOB for ~15mins with  fair balance. Does sway some, so cues given to get feet to floor and keep BUEs at EOB to help. CS for safety at all times    Outcome Measures:  [facility] Basic Mobility  Turning from your back to your side while in a flat bed without using bedrails: A lot  Moving from lying on your back to sitting on the side of a flat bed without using bedrails: A lot  Moving to and from bed to chair (including a wheelchair): A little  Standing up from a chair using your arms (e.g. wheelchair or bedside chair): A little  To walk in hospital room: A lot  Climbing 3-5 steps with railing: Total  Basic Mobility - Total Score: 13    Encounter Problems       Encounter Problems (Active)       Balance       Sitting Balance (Progressing)       Start:  06/09/25    Expected End:  06/23/25       Pt will demonstrate good sitting balance to promote safe engagement with out of bed activities           Standing Balance (Progressing)       Start:  06/09/25    Expected End:  06/23/25       Pt will demonstrate good static standing balance to promote safe participation with out of bed activity, transfers, and mobility              Mobility       Ambulation (Progressing)       Start:  06/09/25    Expected End:  06/23/25       Pt will ambulate 50' modified independent assist with walker to promote safe home mobility              PT Transfers       Supine to sit (Progressing)       Start:  06/09/25    Expected End:  06/23/25       Pt will transfer supine to sitting at edge of bed with modified independent assist to promote acute care out of bed activity           Sit to stand (Progressing)       Start:  06/09/25    Expected End:  06/23/25       Pt will transfer sit to standing position with modified independent assist and walker to promote safe out of bed activity           Bed to chair (Progressing)       Start:  06/09/25    Expected End:  06/23/25       Pt will transfer from sitting edge of bed to the chair with modified independent assist and walker to  promote out of bed activity and reduce the risks of prolonged acute care bedrest              Pain - Adult          Safety       Safe Mobility Techniques (Progressing)       Start:  06/09/25    Expected End:  06/23/25       Pt will correctly identify and demonstrate safe mobility techniques to reduce their risks for falls during their acute care stay                   Education Documentation  Mobility Training, taught by Bill Urbina, PT at 6/9/2025  3:01 PM.  Learner: Patient  Readiness: Acceptance  Method: Explanation, Demonstration  Response: Needs Reinforcement  Comment: safe mobility techniques    Education Comments  No comments found.

## 2025-06-09 NOTE — PROGRESS NOTES
Vancomycin Dosing by Pharmacy- INITIAL    Dexter Avalos is a 79 y.o. year old male who Pharmacy has been consulted for vancomycin dosing for cellulitis, skin and soft tissue. Based on the patient's indication and renal status this patient will be dosed based on a goal AUC of 400-600.     Renal function is currently stable.    Visit Vitals  /87   Pulse 71   Temp 36.7 °C (98.1 °F) (Temporal)   Resp 18        Lab Results   Component Value Date    CREATININE 1.33 (H) 2025    CREATININE 1.45 (H) 10/12/2024    CREATININE 1.40 2024    CREATININE 1.1 2022        Patient weight is as follows:   Vitals:    25 0442   Weight: 134 kg (296 lb)       Cultures:  No results found for the encounter in last 14 days.        I/O last 3 completed shifts:  In: 150 (1.1 mL/kg) [IV Piggyback:150]  Out: - (0 mL/kg)   Weight: 134.3 kg   I/O during current shift:  No intake/output data recorded.    Temp (24hrs), Av.7 °C (98.1 °F), Min:36.7 °C (98.1 °F), Max:36.7 °C (98.1 °F)         Assessment/Plan     Patient has already been given a loading dose of 2000 mg in ER.  Will initiate vancomycin maintenance, 1500 mg every 24 hours.    This dosing regimen is predicted by [employer] to result in the following pharmacokinetic parameters:    Loading dose: N/A  Regimen: 1500 mg IV every 24 hours.  Start time: 05:40 on 06/10/2025  Exposure target: AUC24 (range) 400-600 mg/L.hr   NOK18-87: 514 mg/L.hr  AUC24,ss: 487 mg/L.hr  Probability of AUC24 > 400: 65 %  Ctrough,ss: 11.3 mg/L  Probability of Ctrough,ss > 20: 27 %    Follow-up level will be ordered on 6-10 at 0500 hr unless clinically indicated sooner.  Will continue to monitor renal function daily while on vancomycin and order serum creatinine at least every 48 hours if not already ordered.  Follow for continued vancomycin needs, clinical response, and signs/symptoms of toxicity.       Justo Lerma, PharmD

## 2025-06-09 NOTE — ED PROVIDER NOTES
HPI   Chief Complaint   Patient presents with    Wound Check       HPI  79-year-old male nursing home patient presents with a bed ulcer.  Patient had this for a while apparently was worse and bleeding this morning he called his doctor recommend he come to the hospital.  Patient is not a great historian regarding the management of this wound.  He thinks it is related to his incontinence.  He does not have any specific complaints such as fevers or chills or pain or nausea or vomiting.      Patient History   Medical History[1]  Surgical History[2]  Family History[3]  Social History[4]    Physical Exam   ED Triage Vitals [06/09/25 0440]   Temperature Heart Rate Respirations BP   36.7 °C (98.1 °F) 76 15 128/63      Pulse Ox Temp Source Heart Rate Source Patient Position   95 % Temporal Monitor Lying      BP Location FiO2 (%)     Left arm --       Physical Exam  General:  Awake, alert, no acute distress.  Head: Normocephalic, Atraumatic  Neck: Supple, trachea midline, no stridor  Skin: Warm and dry, grade 4 pressure wound medial right perineal region  Lungs: Clear to auscultation bilaterally no acute respiratory distress, speaking in full sentences without difficulty  CV: Regular Rate Rhythm with no obvious murmurs gallops rubs noted, no jugular venous distention, no pedal edema   Abdomen: Soft, obese, nontender, nondistended, positive bowel sounds, no peritoneal signs  Neuro:  No gross focal neurologic deficits, NIH is 0  Musculoskeletal:  Full range of motion in all 4 extremities  Psychiatric:  Alert oriented x 3, Good insight into condition.    ED Course & MDM   Diagnoses as of 06/09/25 0547   Wound of buttock, unspecified laterality, initial encounter                 No data recorded     Bhanu Coma Scale Score: 15 (06/09/25 0441 : Eveline Christopher, JACIEL)                           Medical Decision Making  Patient vital signs are stable his white blood cell count is normal.  Given his clinical presentation he was  started on IV antibiotics.  Dr. Tom sent him in to the emergency department for admission to the hospital.  He was admitted in stable condition.    Procedure  Procedures       [1]   Past Medical History:  Diagnosis Date    Anxiety     Cellulitis and abscess of lower extremity     COPD (chronic obstructive pulmonary disease) (Multi)     Depression     Hypertension     Obesity     DANELLE (obstructive sleep apnea)    [2] No past surgical history on file.  [3] No family history on file.  [4]   Social History  Tobacco Use    Smoking status: Former     Current packs/day: 0.50     Types: Cigarettes    Smokeless tobacco: Never   Substance Use Topics    Alcohol use: Not Currently    Drug use: Not on file        Jesse Stewart DO  06/09/25 0548

## 2025-06-09 NOTE — PROGRESS NOTES
06/09/25 1049   Discharge Planning   Living Arrangements Other (Comment)  (Patient is a resident of [facility])   Assistance Needed Patient reports he has help for ADLs as needed, and is dependent for IADLs. Services are provided by staff at [facility].   Type of Residence Nursing home/residential care   Do you have animals or pets at home? No   Who is requesting discharge planning? Provider   Home or Post Acute Services Post acute facilities (Rehab/SNF/etc)   Type of Post Acute Facility Services Long term care   Expected Discharge Disposition Inter   Does the patient need discharge transport arranged? Yes   RoundTrip coordination needed? Yes   Has discharge transport been arranged? No   Financial Resource Strain   How hard is it for you to pay for the very basics like food, housing, medical care, and heating? Not very   Housing Stability   In the last 12 months, was there a time when you were not able to pay the mortgage or rent on time? N   In the past 12 months, how many times have you moved where you were living? 1   At any time in the past 12 months, were you homeless or living in a shelter (including now)? N   Transportation Needs   In the past 12 months, has lack of transportation kept you from medical appointments or from getting medications? no   In the past 12 months, has lack of transportation kept you from meetings, work, or from getting things needed for daily living? No   Patient Choice   Provider Choice list and CMS website (https://medicare.gov/care-compare#search) for post-acute Quality and Resource Measure Data were provided and reviewed with: Patient   Patient / Family choosing to utilize agency / facility established prior to hospitalization Yes   Stroke Family Assessment   Stroke Family Assessment Needed No   Intensity of Service   Intensity of Service 0-30 min     MSW met with patient at bedside. He reports he is a resident of [facility], and plans to return there upon discharge. He  denies any home going needs or concerns at this time. Updates sent to [facility] and requested they confirm patient is LTC and that there are no barriers to his return.     11:01 AM [facility] confirmed patient came from their facility and that there are no barriers to his return. Care Transitions will continue to follow.

## 2025-06-09 NOTE — PROGRESS NOTES
Spiritual Care Visit  Spiritual Care Request    Reason for Visit:  Routine Visit: Introduction  Crisis Visit: ED     Request Received From:       Focus of Care:  Visited With: Patient         Refer to :          Spiritual Care Assessment    Spiritual Assessment:                      Care Provided:  Intended Effects: Build relationship of care and support, Convey a calming presence, Demonstrate caring and concern    Sense of Community and or Yazidism Affiliation:  Latter day   Values/Beliefs  Spiritual Requests During Hospitalization: Dexter asked to be anointed today.     Addressed Needs/Concerns and/or Eugenio Through:     Sacramental Encounters  Sacrament of Sick-Anointing: Anointed    Outcome:        Advance Directives:         Spiritual Care Annotation    Annotation:  Dexter asked to be anointed today.  Hamilton Simon

## 2025-06-10 ENCOUNTER — APPOINTMENT (OUTPATIENT)
Dept: CARDIOLOGY | Facility: HOSPITAL | Age: 80
DRG: 593 | End: 2025-06-10
Payer: COMMERCIAL

## 2025-06-10 LAB
ALBUMIN SERPL BCP-MCNC: 2.8 G/DL (ref 3.4–5)
ALP SERPL-CCNC: 41 U/L (ref 33–136)
ALT SERPL W P-5'-P-CCNC: 4 U/L (ref 10–52)
ANION GAP SERPL CALCULATED.3IONS-SCNC: 11 MMOL/L (ref 10–20)
AST SERPL W P-5'-P-CCNC: 10 U/L (ref 9–39)
BILIRUB SERPL-MCNC: 0.3 MG/DL (ref 0–1.2)
BUN SERPL-MCNC: 23 MG/DL (ref 6–23)
CALCIUM SERPL-MCNC: 7.8 MG/DL (ref 8.6–10.3)
CHLORIDE SERPL-SCNC: 111 MMOL/L (ref 98–107)
CO2 SERPL-SCNC: 23 MMOL/L (ref 21–32)
CREAT SERPL-MCNC: 1.11 MG/DL (ref 0.5–1.3)
EGFRCR SERPLBLD CKD-EPI 2021: 67 ML/MIN/1.73M*2
ERYTHROCYTE [DISTWIDTH] IN BLOOD BY AUTOMATED COUNT: 16.7 % (ref 11.5–14.5)
GLUCOSE SERPL-MCNC: 97 MG/DL (ref 74–99)
HCT VFR BLD AUTO: 31.1 % (ref 41–52)
HGB BLD-MCNC: 9.6 G/DL (ref 13.5–17.5)
MCH RBC QN AUTO: 30.6 PG (ref 26–34)
MCHC RBC AUTO-ENTMCNC: 30.9 G/DL (ref 32–36)
MCV RBC AUTO: 99 FL (ref 80–100)
NRBC BLD-RTO: 0 /100 WBCS (ref 0–0)
PLATELET # BLD AUTO: 256 X10*3/UL (ref 150–450)
POTASSIUM SERPL-SCNC: 3.8 MMOL/L (ref 3.5–5.3)
PROT SERPL-MCNC: 5.4 G/DL (ref 6.4–8.2)
RBC # BLD AUTO: 3.14 X10*6/UL (ref 4.5–5.9)
SODIUM SERPL-SCNC: 141 MMOL/L (ref 136–145)
VANCOMYCIN SERPL-MCNC: 8.2 UG/ML (ref 5–20)
WBC # BLD AUTO: 7.2 X10*3/UL (ref 4.4–11.3)

## 2025-06-10 PROCEDURE — 97110 THERAPEUTIC EXERCISES: CPT | Mod: GP

## 2025-06-10 PROCEDURE — 2500000004 HC RX 250 GENERAL PHARMACY W/ HCPCS (ALT 636 FOR OP/ED)

## 2025-06-10 PROCEDURE — 93005 ELECTROCARDIOGRAM TRACING: CPT

## 2025-06-10 PROCEDURE — 85027 COMPLETE CBC AUTOMATED: CPT | Performed by: INTERNAL MEDICINE

## 2025-06-10 PROCEDURE — 80053 COMPREHEN METABOLIC PANEL: CPT | Performed by: INTERNAL MEDICINE

## 2025-06-10 PROCEDURE — 2500000002 HC RX 250 W HCPCS SELF ADMINISTERED DRUGS (ALT 637 FOR MEDICARE OP, ALT 636 FOR OP/ED): Performed by: INTERNAL MEDICINE

## 2025-06-10 PROCEDURE — 76937 US GUIDE VASCULAR ACCESS: CPT

## 2025-06-10 PROCEDURE — 80202 ASSAY OF VANCOMYCIN: CPT

## 2025-06-10 PROCEDURE — 97535 SELF CARE MNGMENT TRAINING: CPT | Mod: GO,CO

## 2025-06-10 PROCEDURE — 1100000001 HC PRIVATE ROOM DAILY

## 2025-06-10 PROCEDURE — 36415 COLL VENOUS BLD VENIPUNCTURE: CPT | Performed by: INTERNAL MEDICINE

## 2025-06-10 PROCEDURE — 97530 THERAPEUTIC ACTIVITIES: CPT | Mod: GO,CO

## 2025-06-10 PROCEDURE — 2500000004 HC RX 250 GENERAL PHARMACY W/ HCPCS (ALT 636 FOR OP/ED): Performed by: EMERGENCY MEDICINE

## 2025-06-10 PROCEDURE — 2500000001 HC RX 250 WO HCPCS SELF ADMINISTERED DRUGS (ALT 637 FOR MEDICARE OP): Performed by: INTERNAL MEDICINE

## 2025-06-10 PROCEDURE — 2500000004 HC RX 250 GENERAL PHARMACY W/ HCPCS (ALT 636 FOR OP/ED): Performed by: INTERNAL MEDICINE

## 2025-06-10 RX ORDER — VANCOMYCIN 2 G/400ML
2000 INJECTION, SOLUTION INTRAVENOUS EVERY 24 HOURS
Status: DISCONTINUED | OUTPATIENT
Start: 2025-06-10 | End: 2025-06-12

## 2025-06-10 RX ADMIN — FUROSEMIDE 20 MG: 20 TABLET ORAL at 17:46

## 2025-06-10 RX ADMIN — CARVEDILOL 6.25 MG: 6.25 TABLET, FILM COATED ORAL at 21:31

## 2025-06-10 RX ADMIN — HYDRALAZINE HYDROCHLORIDE 25 MG: 25 TABLET ORAL at 21:31

## 2025-06-10 RX ADMIN — CETIRIZINE HYDROCHLORIDE 10 MG: 10 TABLET, FILM COATED ORAL at 09:57

## 2025-06-10 RX ADMIN — FUROSEMIDE 20 MG: 20 TABLET ORAL at 09:58

## 2025-06-10 RX ADMIN — CEFEPIME HYDROCHLORIDE 2 G: 2 INJECTION, SOLUTION INTRAVENOUS at 21:30

## 2025-06-10 RX ADMIN — POTASSIUM CHLORIDE 10 MEQ: 750 TABLET, EXTENDED RELEASE ORAL at 09:57

## 2025-06-10 RX ADMIN — CARVEDILOL 6.25 MG: 6.25 TABLET, FILM COATED ORAL at 09:57

## 2025-06-10 RX ADMIN — VANCOMYCIN 2000 MG: 2 INJECTION, SOLUTION INTRAVENOUS at 09:58

## 2025-06-10 RX ADMIN — BUPROPION HYDROCHLORIDE 100 MG: 100 TABLET, EXTENDED RELEASE ORAL at 09:57

## 2025-06-10 RX ADMIN — POLYETHYLENE GLYCOL 3350 17 G: 17 POWDER, FOR SOLUTION ORAL at 09:57

## 2025-06-10 RX ADMIN — TAMSULOSIN HYDROCHLORIDE 0.4 MG: 0.4 CAPSULE ORAL at 09:57

## 2025-06-10 RX ADMIN — HYDRALAZINE HYDROCHLORIDE 25 MG: 25 TABLET ORAL at 14:30

## 2025-06-10 RX ADMIN — Medication 50 MCG: at 09:57

## 2025-06-10 RX ADMIN — NYSTATIN 1 APPLICATION: 100000 POWDER TOPICAL at 10:06

## 2025-06-10 RX ADMIN — SODIUM CHLORIDE 50 ML/HR: 900 INJECTION, SOLUTION INTRAVENOUS at 01:34

## 2025-06-10 RX ADMIN — OXYBUTYNIN CHLORIDE 5 MG: 5 TABLET ORAL at 09:57

## 2025-06-10 RX ADMIN — NYSTATIN 1 APPLICATION: 100000 POWDER TOPICAL at 21:35

## 2025-06-10 RX ADMIN — CEFEPIME HYDROCHLORIDE 2 G: 2 INJECTION, SOLUTION INTRAVENOUS at 05:14

## 2025-06-10 RX ADMIN — VITAM B12 100 MCG: 100 TAB at 09:57

## 2025-06-10 RX ADMIN — CEFEPIME HYDROCHLORIDE 2 G: 2 INJECTION, SOLUTION INTRAVENOUS at 14:31

## 2025-06-10 RX ADMIN — FUROSEMIDE 20 MG: 20 TABLET ORAL at 12:48

## 2025-06-10 RX ADMIN — BUPROPION HYDROCHLORIDE 100 MG: 100 TABLET, EXTENDED RELEASE ORAL at 21:30

## 2025-06-10 RX ADMIN — HYDRALAZINE HYDROCHLORIDE 25 MG: 25 TABLET ORAL at 09:57

## 2025-06-10 RX ADMIN — OXYBUTYNIN CHLORIDE 5 MG: 5 TABLET ORAL at 21:31

## 2025-06-10 ASSESSMENT — COGNITIVE AND FUNCTIONAL STATUS - GENERAL
MOBILITY SCORE: 14
STANDING UP FROM CHAIR USING ARMS: A LITTLE
MOVING TO AND FROM BED TO CHAIR: A LOT
MOVING TO AND FROM BED TO CHAIR: A LITTLE
CLIMB 3 TO 5 STEPS WITH RAILING: A LOT
HELP NEEDED FOR BATHING: A LOT
WALKING IN HOSPITAL ROOM: A LOT
HELP NEEDED FOR BATHING: A LOT
TOILETING: TOTAL
DAILY ACTIVITIY SCORE: 14
PERSONAL GROOMING: A LITTLE
EATING MEALS: A LITTLE
CLIMB 3 TO 5 STEPS WITH RAILING: TOTAL
TOILETING: A LOT
EATING MEALS: A LITTLE
TURNING FROM BACK TO SIDE WHILE IN FLAT BAD: A LOT
DAILY ACTIVITIY SCORE: 13
MOVING FROM LYING ON BACK TO SITTING ON SIDE OF FLAT BED WITH BEDRAILS: A LITTLE
MOBILITY SCORE: 13
PERSONAL GROOMING: A LOT
TURNING FROM BACK TO SIDE WHILE IN FLAT BAD: A LITTLE
DRESSING REGULAR LOWER BODY CLOTHING: A LOT
STANDING UP FROM CHAIR USING ARMS: A LOT
DRESSING REGULAR UPPER BODY CLOTHING: A LITTLE
DRESSING REGULAR UPPER BODY CLOTHING: A LOT
DRESSING REGULAR LOWER BODY CLOTHING: A LOT
MOVING FROM LYING ON BACK TO SITTING ON SIDE OF FLAT BED WITH BEDRAILS: A LOT
WALKING IN HOSPITAL ROOM: A LOT

## 2025-06-10 ASSESSMENT — ACTIVITIES OF DAILY LIVING (ADL): HOME_MANAGEMENT_TIME_ENTRY: 18

## 2025-06-10 ASSESSMENT — PAIN SCALES - GENERAL
PAINLEVEL_OUTOF10: 0 - NO PAIN

## 2025-06-10 ASSESSMENT — PAIN - FUNCTIONAL ASSESSMENT
PAIN_FUNCTIONAL_ASSESSMENT: 0-10
PAIN_FUNCTIONAL_ASSESSMENT: 0-10

## 2025-06-10 NOTE — PROGRESS NOTES
Vancomycin Dosing by Pharmacy- FOLLOW UP    Dexter Avalos is a 79 y.o. year old male who Pharmacy has been consulted for vancomycin dosing for cellulitis, skin and soft tissue. Based on the patient's indication and renal status this patient is being dosed based on a goal AUC of 400-600.     Renal function is currently stable.    Current vancomycin dose: 1500 mg given every 24 hours    Estimated vancomycin AUC on current dose: 368 mg/L.hr     Visit Vitals  /56 (BP Location: Right arm, Patient Position: Lying)   Pulse 82   Temp 37.4 °C (99.3 °F) (Temporal)   Resp 17        Lab Results   Component Value Date    CREATININE 1.11 06/10/2025    CREATININE 1.33 (H) 2025    CREATININE 1.45 (H) 10/12/2024    CREATININE 1.40 2024        Patient weight is as follows:   Vitals:    25 0442   Weight: 134 kg (296 lb)       Cultures:  No results found for the encounter in last 14 days.       I/O last 3 completed shifts:  In: 1802.5 (13.4 mL/kg) [P.O.:300; I.V.:1202.5 (9 mL/kg); IV Piggyback:300]  Out: 1025 (7.6 mL/kg) [Urine:1025 (0.2 mL/kg/hr)]  Weight: 134.3 kg   I/O during current shift:  No intake/output data recorded.    Temp (24hrs), Av.4 °C (99.3 °F), Min:36.4 °C (97.5 °F), Max:38.1 °C (100.6 °F)      Assessment/Plan    Below goal AUC. Orders placed for new vancomcyin regimen of 2000 mg every 24 hours to begin at 1000 hr.     This dosing regimen is predicted by [employer] to result in the following pharmacokinetic parameters:    Loading dose: N/A  Regimen: 2000 mg IV every 24 hours.  Start time: 09:25 on 06/10/2025  Exposure target: AUC24 (range) 400-600 mg/L.hr   IZS72-11: 484 mg/L.hr  AUC24,ss: 491 mg/L.hr  Probability of AUC24 > 400: 86 %  Ctrough,ss: 8.8 mg/L  Probability of Ctrough,ss > 20: 0 %    The next level will be obtained on 25 at 0500 hr. May be obtained sooner if clinically indicated.   Will continue to monitor renal function daily while on vancomycin and order serum creatinine  at least every 48 hours if not already ordered.  Follow for continued vancomycin needs, clinical response, and signs/symptoms of toxicity.       Justo Lerma, PharmD

## 2025-06-10 NOTE — PROGRESS NOTES
Occupational Therapy    OT Treatment    Patient Name: Dexter Avalos  MRN: [id number]  Department: [address]  Room: [facility]  Today's Date: 6/10/2025  Time Calculation  Start Time: 1700  Stop Time: 1728  Time Calculation (min): 28 min        Assessment:  OT Assessment: Pt actively participating and making steady progress to dresing, transfers and activity tolerance to POC.  Will continue ot address deficits with skilled OT intervention.  Prognosis: Good  Barriers to Discharge Home: Physical needs  Physical Needs: 24hr ADL assistance needed, 24hr mobility assistance needed, Returning to long-term care/other facility  Evaluation/Treatment Tolerance: Patient tolerated treatment well  Medical Staff Made Aware: Yes  End of Session Communication: Bedside nurse  End of Session Patient Position: Up in chair, Alarm on (call light in reach all needs met)  OT Assessment Results: Decreased ADL status, Decreased endurance, Decreased functional mobility, Decreased upper extremity range of motion, Decreased upper extremity strength  Prognosis: Good  Evaluation/Treatment Tolerance: Patient tolerated treatment well  Medical Staff Made Aware: Yes  Strengths: Support of Caregivers  Barriers to Participation: Comorbidities  Plan:  Treatment Interventions: ADL retraining, Functional transfer training, Endurance training, Patient/family training, Equipment evaluation/education, Compensatory technique education  OT Frequency: 3 times per week  OT Discharge Recommendations: Low intensity level of continued care, 24 hr supervision due to cognition  Equipment Recommended upon Discharge: Wheeled walker  OT Recommended Transfer Status: Assist of 1, Assistive equipment (Comment) (RW)  OT - OK to Discharge: Yes  Treatment Interventions: ADL retraining, Functional transfer training, Endurance training, Patient/family training, Equipment evaluation/education, Compensatory technique education    Subjective   OT Visit Info:  OT Received On:  06/10/25  General Visit Info:  General  Reason for Referral: decreased ADL's d/t wound of buttock  Referred By: Dr. Tom  Past Medical History Relevant to Rehab: Anxiety  Cellulitis and abscess of lower extremity  COPD (chronic obstructive pulmonary disease) (Multi)  Depression  Hypertension  Obesity  DANELLE (obstructive sleep apnea)  Prior to Session Communication: Bedside nurse  Patient Position Received: Up in chair, Alarm on  Preferred Learning Style: verbal, visual  General Comment:  (Pt cleared by NSG and is agreeable to skilled OT intervention)  Precautions:  Medical Precautions: Fall precautions     Date/Time Vitals Session Patient Position Pulse Resp SpO2 BP MAP (mmHg)    06/10/25 1541 --  --  59  16  93 %  133/62  86           Pain:  Pain Assessment  Pain Assessment: 0-10  0-10 (Numeric) Pain Score: 0 - No pain    Objective    Cognition:  Cognition  Overall Cognitive Status: Impaired  Orientation Level: Oriented X4  Safety/Judgement:  (decreased)  Insight: Mild  Impulsive: Mildly  Processing Speed: Delayed  Coordination:  Movements are Fluid and Coordinated: Yes  Activities of Daily Living:      Grooming  Grooming Level of Assistance: Setup  Grooming Where Assessed: Recliner  Grooming Comments: combing hair/beard, brushing teeth, washing face items in reach    UE Dressing  UE Dressing Level of Assistance: Setup  UE Dressing Where Assessed: Recliner  UE Dressing Comments: doff/don hospitalgown fastened prior  Toileting  Toileting Adaptive Equipment: Cathertization equipment (+[place])  Toileting Level of Assistance: Maximum assistance  Where Assessed: Other (Comment) (recliner)  Toileting Comments: assist hygiene /clothing  Functional Standing Tolerance:  Time: 1 minute  Activity: hygiene buttocks  Functional Standing Tolerance Comments: FRANCISCO BUE support  Bed Mobility/Transfers:      Transfers  Transfer: Yes  Transfer 1  Transfer From 1: Chair with arms to  Transfer to 1: Stand  Technique 1: Sit to stand, Stand  to sit  Transfer Device 1: Walker (RW)  Transfer Level of Assistance 1: +2, Minimum assistance  Trials/Comments 1: assit d/t low surface eucated Pt with hand lacement and body mechanics addition 2 pillows to increase skin integrity /ease transfers  Transfers 2  Transfer From 2: Chair with arms to  Transfer to 2: Stand  Technique 2: Sit to stand, Stand to sit  Transfer Device 2: Walker (RW)  Transfer Level of Assistance 2: Minimum assistance  Trials/Comments 2: second trial after placement pillows to chair seat Pt demonstrate ease and confidence with task  Sitting Balance:  Dynamic Sitting Balance  Dynamic Sitting-Balance Support: Feet supported  Dynamic Sitting-Level of Assistance: Modified independent  Dynamic Sitting-Balance: Reaching for objects, Forward lean, Reaching across midline  Dynamic Sitting-Comments: during self care skills  Therapy/Activity:    Therapeutic Activity  Therapeutic Activity Performed: Yes  Therapeutic Activity 1: Pt actively participating 10 minutes session    Outcome Measures:[facility] Daily Activity  Putting on and taking off regular lower body clothing: A lot  Bathing (including washing, rinsing, drying): A lot  Putting on and taking off regular upper body clothing: A little  Toileting, which includes using toilet, bedpan or urinal: Total  Taking care of personal grooming such as brushing teeth: A little  Eating Meals: A little  Daily Activity - Total Score: 14    Education Documentation  Body Mechanics, taught by LEDY Zavala at 6/10/2025  5:37 PM.  Learner: Patient  Readiness: Acceptance  Method: Explanation, Demonstration, Teach-back  Response: Verbalizes Understanding, Demonstrated Understanding, Needs Reinforcement  Comment: Instructed Pt with safety in transfers, balance strategies, adaptive ADL skills and body mechanics    Precautions, taught by LEDY Zavala at 6/10/2025  5:37 PM.  Learner: Patient  Readiness: Acceptance  Method: Explanation, Demonstration,  Teach-back  Response: Verbalizes Understanding, Demonstrated Understanding, Needs Reinforcement  Comment: Instructed Pt with safety in transfers, balance strategies, adaptive ADL skills and body mechanics    ADL Training, taught by LEDY Zavala at 6/10/2025  5:37 PM.  Learner: Patient  Readiness: Acceptance  Method: Explanation, Demonstration, Teach-back  Response: Verbalizes Understanding, Demonstrated Understanding, Needs Reinforcement  Comment: Instructed Pt with safety in transfers, balance strategies, adaptive ADL skills and body mechanics    Education Comments  No comments found.        OP EDUCATION:       Goals:  Encounter Problems       Encounter Problems (Active)       OT Goals       ADL's (Progressing)       Start:  06/09/25    Expected End:  06/23/25       Patient will complete ADL tasks, with minimal assist  using AE need in order to increase patient's safety and independence with self-care tasks.           Functional transfers (Progressing)       Start:  06/09/25    Expected End:  06/23/25       Patient will complete functional transfers with contact guard assist using least restrictive device, in order to increase patient's safety and independence with daily tasks.           Activity tolerance (Progressing)       Start:  06/09/25    Expected End:  06/23/25       Patient will demonstrate the ability to participate in functional activity at least >/= 25 minutes in order to increase patient's safety and independence with daily tasks.

## 2025-06-10 NOTE — PROGRESS NOTES
06/10/25 1110   Discharge Planning   Living Arrangements Other (Comment)  (Long term care - [facility])   Support Systems Family members   Type of Residence Private residence   Home or Post Acute Services Post acute facilities (Rehab/SNF/etc)   Type of Post Acute Facility Services Long term care   Expected Discharge Disposition Inter  ([facility])   Does the patient need discharge transport arranged? Yes   RoundTrip coordination needed? Yes   Has discharge transport been arranged? No     Return to long term care - [facility] at discharge.

## 2025-06-10 NOTE — PROGRESS NOTES
Physical Therapy Treatment    Patient Name: Dexter Avalos  MRN: [id number]  Department: [address]  Room: [address] [place] [address]  Today's Date: 6/10/2025  Time Calculation  Start Time: 1524  Stop Time: 1538  Time Calculation (min): 14 min         Assessment/Plan   PT Assessment  Barriers to Discharge Home: Physical needs  Physical Needs: 24hr mobility assistance needed, 24hr ADL assistance needed, Returning to long-term care/other facility  Evaluation/Treatment Tolerance: Patient tolerated treatment well  Medical Staff Made Aware: Yes  End of Session Communication: Bedside nurse  Assessment Comment: Continues to demonstrate weakness warranting skilled PT care. Limited ability to practice transfer training due to cognition? Pt verbally agreeable to transfer yet physically seems to resist attempts?  End of Session Patient Position: Up in chair, Alarm on     PT Plan  Treatment/Interventions: Bed mobility, Transfer training, Gait training, Balance training, Strengthening, Endurance training, Therapeutic exercise, Therapeutic activity, Home exercise program, Positioning, Postural re-education  PT Plan: Ongoing PT  PT Frequency: 3 times per week  PT Discharge Recommendations: Low intensity level of continued care  Equipment Recommended upon Discharge: Wheeled walker  PT Recommended Transfer Status: Assist x1, Assistive device  PT - OK to Discharge: Yes    PT Visit Info:  PT Received On: 06/10/25  Response to Previous Treatment: Patient with no complaints from previous session.     General Visit Information:   General  Family/Caregiver Present: No  Prior to Session Communication: Bedside nurse  Patient Position Received: Up in chair, Alarm on  Preferred Learning Style: verbal, visual  General Comment: Agreeable to PT follow up    Subjective   Precautions:  Precautions  Medical Precautions: Fall precautions     Date/Time Vitals Session Patient Position Pulse Resp SpO2 BP MAP (mmHg)    06/10/25 1541 --  --  59  16  93 %  133/62  86                  Objective   Pain:  Pain Assessment  Pain Assessment: 0-10  0-10 (Numeric) Pain Score: 0 - No pain  Cognition:  Cognition  Overall Cognitive Status: Impaired  Cognition Comments: Very confused, poor safety insight, repeats questions multiple times    Activity Tolerance:  Activity Tolerance  Endurance: Decreased tolerance for upright activites  Treatments:  Therapeutic Exercise  Therapeutic Exercise Performed: Yes  Therapeutic Exercise Activity 1: Pt educated on and completes B ankle pumps x20, knee kicks x10, resisted hip abd x10, resisted hip add x10, marches x10, glute squeezes x10    Transfers  Transfer: Yes  Transfer 1  Trials/Comments 1: Pt verbally agreeable to practicing transfers+short gait in room. On first attempt, Pt is cued for BUE pushoff and anterior weight shifting. On attempt, Pt locks BUEs and seems to push backwards resisting assist from PT to stand. PT terminates attempt. Discussed with Pt, Pt still agreeable to try to stand. Cues for pushoff vs full UE extension posterior. 2 other trials of standing attempted but not successful today. RN aware    Outcome Measures:  [facility] Basic Mobility  Turning from your back to your side while in a flat bed without using bedrails: A lot  Moving from lying on your back to sitting on the side of a flat bed without using bedrails: A lot  Moving to and from bed to chair (including a wheelchair): A little  Standing up from a chair using your arms (e.g. wheelchair or bedside chair): A little  To walk in hospital room: A lot  Climbing 3-5 steps with railing: Total  Basic Mobility - Total Score: 13    Education Documentation  Mobility Training, taught by Bill Urbina, PT at 6/10/2025  4:48 PM.  Learner: Patient  Readiness: Acceptance  Method: Explanation, Demonstration  Response: Needs Reinforcement  Comment: safe mobility techniques, HEP    Education Comments  No comments found.        OP EDUCATION:       Encounter Problems       Encounter Problems  (Active)       Balance       Sitting Balance (Progressing)       Start:  06/09/25    Expected End:  06/23/25       Pt will demonstrate good sitting balance to promote safe engagement with out of bed activities           Standing Balance (Progressing)       Start:  06/09/25    Expected End:  06/23/25       Pt will demonstrate good static standing balance to promote safe participation with out of bed activity, transfers, and mobility              Mobility       Ambulation (Progressing)       Start:  06/09/25    Expected End:  06/23/25       Pt will ambulate 50' modified independent assist with walker to promote safe home mobility              PT Transfers       Supine to sit (Progressing)       Start:  06/09/25    Expected End:  06/23/25       Pt will transfer supine to sitting at edge of bed with modified independent assist to promote acute care out of bed activity           Sit to stand (Progressing)       Start:  06/09/25    Expected End:  06/23/25       Pt will transfer sit to standing position with modified independent assist and walker to promote safe out of bed activity           Bed to chair (Progressing)       Start:  06/09/25    Expected End:  06/23/25       Pt will transfer from sitting edge of bed to the chair with modified independent assist and walker to promote out of bed activity and reduce the risks of prolonged acute care bedrest              Pain - Adult          Safety       Safe Mobility Techniques (Progressing)       Start:  06/09/25    Expected End:  06/23/25       Pt will correctly identify and demonstrate safe mobility techniques to reduce their risks for falls during their acute care stay

## 2025-06-10 NOTE — PROGRESS NOTES
Dexter Avalos is a 79 y.o. male on day 1 of admission presenting with Wound of buttock, unspecified laterality, initial encounter.    Subjective   The patient was seen and examined.  Lying in the bed.  Comfortable in the bed.  Patient denied any headache or dizziness.  No nausea vomiting.       Objective     Physical Exam  HEENT:  Head externally atraumatic,  extraocular movements intact, oral mucosa moist  Neck:  Supple, no JVP, no palpable adenopathy or thyromegaly.  No carotid bruit.  Chest:  Clear to auscultation and resonant.  Heart:  Regular rate and rhythm, no murmur or gallop could be appreciated.  Abdomen: Bilateral fungal infections of skin creases including the groin and breast.  the patient has bilateral dressing now.  Extremities:  No edema, pulses present, no cyanosis or clubbing.  CNS:  Patient alert, oriented to time, place and person.    No new deficit.  Cranial nerves 2-12 grossly intact  Skin:  No active rash.  Musculoskeletal:  No  apparent joint swelling or erythema, range of movement normal.  Last Recorded Vitals  Heart Rate:  [59-85]   Temp:  [36.7 °C (98.1 °F)-38.1 °C (100.6 °F)]   Resp:  [16-18]   BP: (113-149)/(38-76)   SpO2:  [93 %-98 %]     Intake/Output last 3 Shifts:  I/O last 3 completed shifts:  In: 3052.5 (22.7 mL/kg) [P.O.:1300; I.V.:1202.5 (9 mL/kg); IV Piggyback:550]  Out: 1425 (10.6 mL/kg) [Urine:1425 (0.3 mL/kg/hr)]  Weight: 134.3 kg     Relevant Results  No results found for the last 90 days.    Results for orders placed or performed during the hospital encounter of 06/09/25 (from the past 24 hours)   Comprehensive metabolic panel   Result Value Ref Range    Glucose 97 74 - 99 mg/dL    Sodium 141 136 - 145 mmol/L    Potassium 3.8 3.5 - 5.3 mmol/L    Chloride 111 (H) 98 - 107 mmol/L    Bicarbonate 23 21 - 32 mmol/L    Anion Gap 11 10 - 20 mmol/L    Urea Nitrogen 23 6 - 23 mg/dL    Creatinine 1.11 0.50 - 1.30 mg/dL    eGFR 67 >60 mL/min/1.73m*2    Calcium 7.8 (L) 8.6 - 10.3  mg/dL    Albumin 2.8 (L) 3.4 - 5.0 g/dL    Alkaline Phosphatase 41 33 - 136 U/L    Total Protein 5.4 (L) 6.4 - 8.2 g/dL    AST 10 9 - 39 U/L    Bilirubin, Total 0.3 0.0 - 1.2 mg/dL    ALT 4 (L) 10 - 52 U/L   CBC   Result Value Ref Range    WBC 7.2 4.4 - 11.3 x10*3/uL    nRBC 0.0 0.0 - 0.0 /100 WBCs    RBC 3.14 (L) 4.50 - 5.90 x10*6/uL    Hemoglobin 9.6 (L) 13.5 - 17.5 g/dL    Hematocrit 31.1 (L) 41.0 - 52.0 %    MCV 99 80 - 100 fL    MCH 30.6 26.0 - 34.0 pg    MCHC 30.9 (L) 32.0 - 36.0 g/dL    RDW 16.7 (H) 11.5 - 14.5 %    Platelets 256 150 - 450 x10*3/uL   Vancomycin   Result Value Ref Range    Vancomycin 8.2 5.0 - 20.0 ug/mL      Current Medications[1]   Assessment/Plan   Assessment & Plan  Wound of buttock, unspecified laterality, initial encounter  Cellulitis of bilateral lower extremity  COPD  Depression  Hypertension  Obstructive sleep apnea  Obesity  Urinary incontinence  BPH    Plan continue current medication.  Supportive care.  The patient was started on broad-spectrum IV antibiotic.  Wound care nurse consult has been obtained.  ID is on consult.  Start nystatin powder for the tenia inguinalis.  We will take DVT, fall, aspiration, decubitus, DVT precaution.  For details see the orders.    Date of service: 06/10/2025    Plan: Continue current medication.  Continued IV antibiotics.  Blood pressure is fairly controlled.  Give supportive care.  Continue nystatin powder.  Monitor renal function panel.  Possible discharge in 2 to 3 days after the antibiotics changed to oral.  Continue local wound care at this time.  We will continue DVT, fall, aspiration, decubitus, and DVT precaution.               Vinh Tom MD           [1]   Current Facility-Administered Medications:     acetaminophen (Tylenol) tablet 650 mg, 650 mg, oral, q4h PRN **OR** acetaminophen (Tylenol) oral liquid 650 mg, 650 mg, oral, q4h PRN **OR** [DISCONTINUED] acetaminophen (Tylenol) suppository 650 mg, 650 mg, rectal, q4h PRN,  Vinh Tom MD    acetaminophen (Tylenol) suppository 325 mg, 325 mg, rectal, q8h PRN, Vinh Tom MD    benzocaine-menthol (Cepastat Sore Throat) lozenge 1 lozenge, 1 lozenge, Mouth/Throat, q2h PRN, Vinh Tom MD    buPROPion SR (Wellbutrin SR) 12 hr tablet 100 mg, 100 mg, oral, BID, Vinh Tom MD, 100 mg at 06/10/25 0957    carvedilol (Coreg) tablet 6.25 mg, 6.25 mg, oral, BID, Vinh Tom MD, 6.25 mg at 06/10/25 0957    cefepime (Maxipime) 2 g in dextrose 5% IV 50 mL, 2 g, intravenous, TID, Vinh Tom MD, Stopped at 06/10/25 1657    cetirizine (ZyrTEC) tablet 10 mg, 10 mg, oral, Daily, Vinh Tom MD, 10 mg at 06/10/25 0957    cholecalciferol (Vitamin D-3) tablet 50 mcg, 50 mcg, oral, Daily, Vinh Tom MD, 50 mcg at 06/10/25 0957    cyanocobalamin (Vitamin B-12) tablet 100 mcg, 100 mcg, oral, Daily, Vinh Tom MD, 100 mcg at 06/10/25 0957    dextromethorphan-guaifenesin (Robitussin DM)  mg/5 mL oral liquid 5 mL, 5 mL, oral, q4h PRN, Vinh Tom MD    furosemide (Lasix) tablet 20 mg, 20 mg, oral, TID, Vinh Tom MD, 20 mg at 06/10/25 1746    guaiFENesin (Mucinex) 12 hr tablet 600 mg, 600 mg, oral, q12h PRN, Vinh Tom MD    hydrALAZINE (Apresoline) tablet 25 mg, 25 mg, oral, TID, Vinh Tom MD, 25 mg at 06/10/25 1430    magnesium hydroxide (Milk of Magnesia) 400 mg/5 mL suspension 30 mL, 30 mL, oral, Daily PRN, Vinh Tom MD    nystatin (Mycostatin) 100,000 unit/gram powder 1 Application, 1 Application, Topical, BID, Vinh Tom MD, 1 Application at 06/10/25 1006    ondansetron ODT (Zofran-ODT) disintegrating tablet 4 mg, 4 mg, oral, q8h PRN **OR** ondansetron (Zofran) injection 4 mg, 4 mg, intravenous, q8h PRN, Vinh Tom MD    oxyBUTYnin (Ditropan) tablet 5 mg, 5 mg, oral, BID, Vinh Tom MD, 5 mg at 06/10/25 4035    polyethylene glycol (Glycolax, Miralax)  packet 17 g, 17 g, oral, Daily PRN, Vinh Tom MD    polyethylene glycol (Glycolax, Miralax) packet 17 g, 17 g, oral, Daily, Vinh Tom MD, 17 g at 06/10/25 0957    potassium chloride CR (Klor-Con) ER tablet 10 mEq, 10 mEq, oral, Daily, Vinh Tom MD, 10 mEq at 06/10/25 0957    tamsulosin (Flomax) 24 hr capsule 0.4 mg, 0.4 mg, oral, Daily, Vinh Tom MD, 0.4 mg at 06/10/25 0957    vancomycin (Vancocin) pharmacy to dose - pharmacy monitoring, , miscellaneous, Daily PRN, Vinh Tom MD    vancomycin (Xellia) 2,000 mg in diluent combination  mL, 2,000 mg, intravenous, q24h, Justo Lerma, PharmD, Stopped at 06/10/25 6275

## 2025-06-10 NOTE — PROGRESS NOTES
Dexter Avalos is a 79 y.o. male on day 1 of admission presenting with Wound of buttock, unspecified laterality, initial encounter.    Subjective   Interval History:   Skin examined with wound care nursing  Nursing at the bedside   Bilateral leg wraps removed, right lower leg with edema, mild erythema, weeping   No chills  Fever noted yesterday night 38.1 C  No shortness of breath cough or chest pain   No nausea, vomiting or diarrhea       Objective   Range of Vitals (last 24 hours)  Heart Rate:  [75-85]   Temp:  [36.4 °C (97.5 °F)-38.1 °C (100.6 °F)]   Resp:  [16-18]   BP: (113-152)/(38-76)   SpO2:  [94 %-98 %]   Daily Weight  06/09/25 : 134 kg (296 lb)    Body mass index is 42.47 kg/m².    Physical Exam  Constitutional:       Appearance: Normal appearance.   HENT:      Head: Normocephalic and atraumatic.      Nose: Nose normal.   Eyes:      Extraocular Movements: Extraocular movements intact.      Conjunctiva/sclera: Conjunctivae normal.   Cardiovascular:      Rate and Rhythm: Normal rate and regular rhythm.   Pulmonary:      Effort: Pulmonary effort is normal.      Breath sounds: Normal breath sounds.   Abdominal:      General: Bowel sounds are normal.      Palpations: Abdomen is soft.   Musculoskeletal:         General: Normal range of motion.      Cervical back: Normal range of motion and neck supple.   Skin:     General: Skin is warm and dry.      Comments:  Intertrigo bilateral under breast, bilateral groin-improving   Right lower leg with edema, mild erythema, weeping-serous fluid   Left leg venous stasis dermatitis    Neurological:      General: No focal deficit present.      Mental Status: He is alert and oriented to person, place, and time. Mental status is at baseline.   Psychiatric:         Mood and Affect: Mood normal.         Behavior: Behavior normal.           Antibiotics  cefepime - 2 gram/50 mL  vancomycin - 2 gram/400 mL    Relevant Results  Labs  Results from last 72 hours   Lab Units  "06/10/25  0455 06/09/25  0500   WBC AUTO x10*3/uL 7.2 7.3   HEMOGLOBIN g/dL 9.6* 10.4*   HEMATOCRIT % 31.1* 33.2*   PLATELETS AUTO x10*3/uL 256 281   NEUTROS PCT AUTO %  --  73.0   LYMPHS PCT AUTO %  --  11.8   MONOS PCT AUTO %  --  11.4   EOS PCT AUTO %  --  2.3     Results from last 72 hours   Lab Units 06/10/25  0455 06/09/25  0500   SODIUM mmol/L 141 139   POTASSIUM mmol/L 3.8 3.7   CHLORIDE mmol/L 111* 104   CO2 mmol/L 23 29   BUN mg/dL 23 26*   CREATININE mg/dL 1.11 1.33*   GLUCOSE mg/dL 97 96   CALCIUM mg/dL 7.8* 8.4*   ANION GAP mmol/L 11 10   EGFR mL/min/1.73m*2 67 54*     Results from last 72 hours   Lab Units 06/10/25  0455 06/09/25  0500   ALK PHOS U/L 41 48   BILIRUBIN TOTAL mg/dL 0.3 0.4   PROTEIN TOTAL g/dL 5.4* 6.6   ALT U/L 4* 6*   AST U/L 10 15   ALBUMIN g/dL 2.8* 3.3*     Estimated Creatinine Clearance: 71.9 mL/min (by C-G formula based on SCr of 1.11 mg/dL).  No results found for: \"CRP\"  Microbiology  Blood culture pending       Imaging  CT abdomen pelvis w IV contrast  Result Date: 6/9/2025  STUDY: CT Abdomen and Pelvis with IV Contrast; 6/09/2025 6:51 AM INDICATION: Perineal pressure wound. COMPARISON: CT AP 5/22/2022. ACCESSION NUMBER(S): [id number] ORDERING CLINICIAN: NIKKI JACOBSEN TECHNIQUE: CT of the abdomen and pelvis was performed.  Contiguous axial images were obtained at 3 mm slice thickness through the abdomen and pelvis. Coronal and sagittal reconstructions at 3 mm slice thickness were performed.  Omnipaque 350 75 mL was administered intravenously.  FINDINGS: Abdomen: The lung bases are clear.  The liver is normal.  The pancreas, spleen, adrenal glands, and kidneys demonstrate no acute pathology. A few simple appearing right renal cysts measure up to 3.6 cm.  Simple appearing 5 cm left renal cyst. There is no free air or lymph node enlargement.  Abdominal aorta is not aneurysmal.  Small fat-containing umbilical hernia defect. Pelvis: There is no bowel wall thickening or " obstruction.  Large overall stool burden.  There is no free fluid.  Lymph nodes are not enlarged. Urinary bladder is unremarkable. Thickened skin of the perineum and upper, medial thighs.  Along the inferior margin of the left buttock there is a 2 cm area of focal fat stranding and likely trace fluid.  Best seen on axial image 161 and coronal image 107. Skeleton:  There are no acute fractures.  No suspicious bony lesions.  Moderate to severe degenerative changes of the lumbar spine.    Thickened skin along the perineum and medial portions of both upper thighs adjusting possible cellulitis.  A 2 cm area along the inferior margin of the left buttock suggests possible very early subcutaneous abscess formation. No concerning findings within the abdomen or pelvis. Signed by Malik Rothman MD            Assessment/Plan   Lower extremity cellulitis   Cutaneous candidiasis-skin folds  Abnormal CT-left buttock possible early abscess formation   Bilateral lower extremity lymphedema  Allergy penicillin, sulfa-tolerating cefepime        IV Cefepime  IV vancomycin-monitor levels  Nystatin powder   Follow up Blood cultures  Monitor renal function  Monitor response to therapy   Local care  Leg elevation   Evaluate for de-escalation in 1-2 days     Yisel Lui, APRN-CNP

## 2025-06-10 NOTE — CONSULTS
Wound Care Consult     Visit Date: 6/10/2025      Patient Name: Dexter Avalos         MRN: [id number]           YOB: 1945    Consulted for wound care. A 79 y.o. male patient admitted for   Wound of buttock, unspecified laterality, initial encounter [S37.820F]   Medical History[1]   Surgical History[2]   Scheduled medications  Scheduled Medications[3]  Continuous medications  Continuous Medications[4]  PRN medications  PRN Medications[5]     Allergies[6]     No results found for the last 90 days.         Pertinent Labs:   Albumin   Date Value Ref Range Status   06/10/2025 2.8 (L) 3.4 - 5.0 g/dL Final       Wound Assessment:  Wound 06/09/25 Pressure Injury Coccyx (Active)   Wound Image   06/09/25 1542   Site Assessment Purple 06/10/25 1400   Farideh-Wound Assessment Blanchable erythema 06/10/25 1400   Pressure Injury Stage DTPI 06/10/25 1400   Wound Length (cm) 1.5 cm 06/10/25 1400   Wound Width (cm) 0.5 cm 06/10/25 1400   Wound Surface Area (cm^2) 0.59 cm^2 06/10/25 1400   Margins Attached edges 06/10/25 1400   Drainage Description None 06/10/25 1400   Drainage Amount None 06/10/25 1400   Dressing Silicone border dressing 06/10/25 1400       Reason for Consult: wound evaluation and recommendations        Wound History: wounds are present on admission, photo by nursing,    Wound Team Assessment: Patient sitting in bedside chair, agreeable to exam, and stood with two assist using walker for posterior exam. Sacral border in use, pealed back, coccyx has purple non blancheable area see above assessment. Patient stood for exam, done quickly as patient was weak. SALMA Lui CNP present for exam. Manning catheter in place, no secured, nursing requested to secure, buttocks and upper posterior thighs are now pink excoriated, nursing applying triad, and recommend to apply triad to Deep Tissue Injury DTI of coccyx and maintain sacral border.     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 Lymphedema of lower extremity controlled with pumps and/or unna boots at facility per patient. Wrapped legs with kerlix, ABD's over baby skin of right ankle, 4 inch ace from above toes to ankle, 6 inch ace from ankle to below knee. Patient requested to have top of velcro taped for security while in bed.      Patient does not have enough slack in Manning tubing to secure with Stat lock, patient is advised to manager with care.      Recommendations: Dressing recommendations shared with provider via EPIC Secure chat.       Patient is on a Centrella Bed System, waffle overlay in place and legs of bed extended to accommodate height. Cleo Rosa RN bedside nurse updated, continue pressure injury prevention interventions and nursing to continue to follow provider orders. Re consult wound RN PRN.    Lissy Hutchins BSN RN [facility]  6/10/2025  2:24 PM         [1]   Past Medical History:  Diagnosis Date    Anxiety     Cellulitis and abscess of lower extremity     COPD (chronic obstructive pulmonary disease) (Multi)     Depression     Hypertension     Obesity     DANELLE (obstructive sleep apnea)    [2] History reviewed. No pertinent surgical history.  [3] buPROPion SR, 100 mg, oral, BID  carvedilol, 6.25 mg, oral, BID  cefepime, 2 g, intravenous, TID  cetirizine, 10 mg, oral, Daily  cholecalciferol, 50 mcg, oral, Daily  cyanocobalamin, 100 mcg, oral, Daily  furosemide, 20 mg, oral, TID  hydrALAZINE, 25 mg, oral, TID  nystatin, 1 Application, Topical, BID  oxyBUTYnin, 5 mg, oral, BID  polyethylene glycol, 17 g, oral, Daily  potassium chloride CR, 10 mEq, oral, Daily  tamsulosin, 0.4 mg, oral, Daily  vancomycin, 2,000 mg, intravenous, q24h  [4]    [5] PRN medications: acetaminophen **OR** acetaminophen **OR**  [DISCONTINUED] acetaminophen, acetaminophen, benzocaine-menthol, dextromethorphan-guaifenesin, guaiFENesin, magnesium hydroxide, ondansetron ODT **OR** ondansetron, polyethylene glycol, vancomycin  [6]   Allergies  Allergen Reactions    Amlodipine Unknown    Penicillin G Unknown    Sulfa (Sulfonamide Antibiotics) Unknown

## 2025-06-10 NOTE — CONSULTS
"Nutrition Assessement Note    Nutrition Assessment    Reason for Assessment: Admission nursing screening    Reason for Hospital Admission:  Dexter Avalos is a 79 y.o. male who is admitted for cellulitis and wound of buttock. Pt from NH.     Malnutrition Screening Tool (MST)  Have you recently lost weight without trying?: No  Weight Loss Score: 0  Have you been eating poorly because of a decreased appetite?: No  Malnutrition Score: 0  Nutrition Screen  Stage 3 or 4 Pressure Injury or Multiple Non-Healing Wounds: Yes (Comment)  Home Tube Feeding or Total Parenteral Nutrition (TPN): No  Dietitian Consult Needed: No    Medical History[1]   Surgical History[2]    Nutrition History:  Food and Nutrient History: Pt reports appetite to be good at nursing home and good during current admission. Pt reports missing his \"refridgerator that has treats in it.\" Pt states he does not have a large selection of foods at nursing home. Pt also does not add salt to foods but does consume some foods high in sodium such as soups, processed foods, and cage. Educated pt on a low sodium diet such as not adding sodium to foods and not choosing high sodium foods such as cage or soups when he is able to at nursing home. Pt is agreeable to trying meena BID to aid in wound healing.    Anthropometrics:  Ht: 177.8 cm (5' 10\"), Wt: 134 kg (296 lb), BMI: 42.47  IBW/kg (Dietitian Calculated): 75.45 kg  Percent of IBW: 178.31 %  Adjusted Body Weight (kg): 90.23 kg    Weight Change:  Daily Weight  06/09/25 : 134 kg (296 lb)  09/12/24 : 134 kg (296 lb 6.4 oz)    Weight History / % Weight Change: Pt reports -300# but believes to have lost a couple pounds a week at the nursing home due to limited food choices. Pt has limited wt hx but wt appears to remain stable.    Nutrition Focused Physical Exam Findings:  (Visual NFPE)  Subcutaneous Fat Loss  Orbital Fat Pads: Well nourished (slightly bulging fat pads)  Buccal Fat Pads: Well nourished (full, " rounded cheeks)    Muscle Wasting  Temporalis: Well nourished (well-defined muscle)  Pectoralis (Clavicular Region): Well nourished (clavicle not visible)    Edema  Edema: +3 moderate  Edema Location: BLE    Physical Findings  Skin: Positive  Positive Skin Findings: Impaired wound healing (Wounds - right posterior upper leg, left hip, and coccyx.)    Nutrition Significant Labs:  Lab Results   Component Value Date    WBC 7.2 06/10/2025    HGB 9.6 (L) 06/10/2025    HCT 31.1 (L) 06/10/2025     06/10/2025    ALT 4 (L) 06/10/2025    AST 10 06/10/2025     06/10/2025    K 3.8 06/10/2025     (H) 06/10/2025    CREATININE 1.11 06/10/2025    BUN 23 06/10/2025    CO2 23 06/10/2025    INR 1.0 05/22/2022     Nutrition Specific Medications:  Scheduled Medications[3]  Continuous Medications[4]    Dietary Orders (From admission, onward)       Start     Ordered    06/10/25 1516  Adult diet 2-3 grams sodium  Diet effective now        Question:  Diet type  Answer:  2-3 grams sodium    06/10/25 1515    06/10/25 1434  Oral nutritional supplements  Until discontinued        Comments: Orange with breakfast and fruit punch with dinner   Question Answer Comment   Deliver with Breakfast    Deliver with Dinner    Select supplement: Rivera        06/10/25 1433    06/09/25 1253  May Participate in Room Service  ( ROOM SERVICE MAY PARTICIPATE)  Once        Question:  .  Answer:  Yes    06/09/25 1252                  Estimated Needs:   Estimated Energy Needs  Total Energy Estimated Needs in 24 hours (kCal): 2255 kCal  Energy Estimated Needs per kg Body Weight in 24 hours (kCal/kg): 25 kCal/kg  Method for Estimating Needs: AjBW    Estimated Protein Needs  Total Protein Estimated Needs in 24 Hours (g): 90 g  Protein Estimated Needs per kg Body Weight in 24 Hours (g/kg): 1 g/kg  Method for Estimating 24 Hour Protein Needs: AjBW    Estimated Fluid Needs  Method for Estimating 24 Hour Fluid Needs: 1ml/kcal or per MD       Nutrition  Diagnosis   Nutrition Diagnosis:  Malnutrition Diagnosis  Patient has Malnutrition Diagnosis: No    Nutrition Diagnosis  Patient has Nutrition Diagnosis: Yes  Diagnosis Status (1): New  Nutrition Diagnosis 1: Food and nutrition related knowledge deficit  Related to (1): lack of prior exposure to accurate nutrition related information  As Evidenced by (1): conditions associated with diagnosis       Nutrition Interventions/Recommendations   Nutrition Interventions and Recommendations:  Nutrition Prescription: Nutrition prescription for oral nutrition    Nutrition Recommendations:  Individualized Nutrition Prescription Provided for : continue diet as ordered, start additional 2-3g Na restriction to help with swelling, and meena BID to aid in wound healing.    Nutrition Interventions/Goals:   Food and/or Nutrient Delivery Interventions  Interventions: Meals and snacks, Medical food supplement  Meals and Snacks: Mineral-modified diet, General healthful diet  Goal: continue diet as ordered and start 2-3g Na restriction to help with swelling.  Medical Food Supplement: Commercial beverage medical food supplement therapy  Goal: meena BID to aid in wound healing    Education Documentation  Nutrition Related Education, taught by Lacy Marcial RD, LD at 6/10/2025  3:26 PM.  Learner: Patient  Readiness: Acceptance  Method: Explanation  Response: Verbalizes Understanding  Comment: low sodium diet              Nutrition Monitoring and Evaluation   Monitoring/Evaluation:   Food/Nutrient Related History Monitoring  Monitoring and Evaluation Plan: Estimated Energy Intake  Estimated Energy Intake: Energy intake greater or equal to 75% of estimated energy needs    Anthropometric Measurements  Monitoring and Evaluation Plan: Body weight  Body Weight: Body weight - Weight reduction from fluids, as needed    Physical Exam Findings  Monitoring and Evaluation Plan: Skin  Skin Finding: Impaired wound healing - Improved wound  healing    Goal Status: New goal(s) identified    Follow Up  Time Spent (min): 30 minutes  Last Date of Nutrition Visit: 06/10/25  Nutrition Follow-Up Needed?: 7-10 days  Follow up Comment: 6/18/25            [1]   Past Medical History:  Diagnosis Date    Anxiety     Cellulitis and abscess of lower extremity     COPD (chronic obstructive pulmonary disease) (Multi)     Depression     Hypertension     Obesity     DANELLE (obstructive sleep apnea)    [2] History reviewed. No pertinent surgical history.  [3] buPROPion SR, 100 mg, oral, BID  carvedilol, 6.25 mg, oral, BID  cefepime, 2 g, intravenous, TID  cetirizine, 10 mg, oral, Daily  cholecalciferol, 50 mcg, oral, Daily  cyanocobalamin, 100 mcg, oral, Daily  furosemide, 20 mg, oral, TID  hydrALAZINE, 25 mg, oral, TID  nystatin, 1 Application, Topical, BID  oxyBUTYnin, 5 mg, oral, BID  polyethylene glycol, 17 g, oral, Daily  potassium chloride CR, 10 mEq, oral, Daily  tamsulosin, 0.4 mg, oral, Daily  vancomycin, 2,000 mg, intravenous, q24h     [4]

## 2025-06-10 NOTE — H&P
History Of Present Illness  Dexter Avalos is a 79 y.o. male presenting with plaint of bleeding for the perineal area.  The patient had a history of chronic leg ulcers and leg edema.  Patient has some bilateral lower extremity.  Patient started bleeding from the upper part of the thigh today.  With this complaint he was sent to emergency room.  Emergency room initial workup was done and patient has been admitted to the floor for further management.  Patient also had a very severe fungal infection in the groin area.  Patient denied any nausea vomiting or diarrhea or dysuria [facility] tachycardia.  No fever, chills or rigor.  No odynophagia, dysphagia or recent change in weight or appetite.  Comfortable titration..     Past Medical History  Medical History[1]    Surgical History  Surgical History[2]     Social History  He reports that he has quit smoking. His smoking use included cigarettes. He has never used smokeless tobacco. He reports that he does not currently use alcohol. No history on file for drug use.    Family History  Family History[3]     Allergies  Amlodipine, Penicillin g, and Sulfa (sulfonamide antibiotics)    Review of Systems  I reviewed all systems reviewed as above otherwise is negative.  Physical Exam  HEENT:  Head externally atraumatic, no pallor, no icterus, extraocular movements intact, pupils reactive to light, oral mucosa moist and throat clear.  Neck:  Supple, no JVP, no palpable adenopathy or thyromegaly.  No carotid bruit.  Chest:  Clear to auscultation and resonant.  Heart:  Regular rate and rhythm, no murmur or gallop could be appreciated.  Abdomen:  Soft, nontender, bowel sounds present, normoactive, no palpable hepatosplenomegaly.  Extremities:  No edema, pulses present, no cyanosis or clubbing.  CNS:  Patient alert, oriented to time, place and person.  Power 5/5 all over and deep tendon reflexes symmetrical, cranial nerves 2-12 grossly intact.  Skin: The patient had a bilateral  "erythema of the lower extremity.  Patient also had a tenia and granules of groin area.  Patient had a wound in posterior medial thigh adjacent to the perineal and perirectal area.  Musculoskeletal:  No joint swelling or erythema, range of movement normal.  Last Recorded Vitals  Heart Rate:  [7-87]   Temp:  [36.4 °C (97.5 °F)-38.1 °C (100.6 °F)]   Resp:  [15-18]   BP: (123-158)/()   Height:  [177.8 cm (5' 10\")]   Weight:  [134 kg (296 lb)]   SpO2:  [83 %-100 %]       Relevant Results        Results for orders placed or performed during the hospital encounter of 06/09/25 (from the past 24 hours)   CBC and Auto Differential   Result Value Ref Range    WBC 7.3 4.4 - 11.3 x10*3/uL    nRBC 0.0 0.0 - 0.0 /100 WBCs    RBC 3.38 (L) 4.50 - 5.90 x10*6/uL    Hemoglobin 10.4 (L) 13.5 - 17.5 g/dL    Hematocrit 33.2 (L) 41.0 - 52.0 %    MCV 98 80 - 100 fL    MCH 30.8 26.0 - 34.0 pg    MCHC 31.3 (L) 32.0 - 36.0 g/dL    RDW 16.9 (H) 11.5 - 14.5 %    Platelets 281 150 - 450 x10*3/uL    Neutrophils % 73.0 40.0 - 80.0 %    Immature Granulocytes %, Automated 1.1 (H) 0.0 - 0.9 %    Lymphocytes % 11.8 13.0 - 44.0 %    Monocytes % 11.4 2.0 - 10.0 %    Eosinophils % 2.3 0.0 - 6.0 %    Basophils % 0.4 0.0 - 2.0 %    Neutrophils Absolute 5.32 1.60 - 5.50 x10*3/uL    Immature Granulocytes Absolute, Automated 0.08 0.00 - 0.50 x10*3/uL    Lymphocytes Absolute 0.86 0.80 - 3.00 x10*3/uL    Monocytes Absolute 0.83 (H) 0.05 - 0.80 x10*3/uL    Eosinophils Absolute 0.17 0.00 - 0.40 x10*3/uL    Basophils Absolute 0.03 0.00 - 0.10 x10*3/uL   Comprehensive Metabolic Panel   Result Value Ref Range    Glucose 96 74 - 99 mg/dL    Sodium 139 136 - 145 mmol/L    Potassium 3.7 3.5 - 5.3 mmol/L    Chloride 104 98 - 107 mmol/L    Bicarbonate 29 21 - 32 mmol/L    Anion Gap 10 10 - 20 mmol/L    Urea Nitrogen 26 (H) 6 - 23 mg/dL    Creatinine 1.33 (H) 0.50 - 1.30 mg/dL    eGFR 54 (L) >60 mL/min/1.73m*2    Calcium 8.4 (L) 8.6 - 10.3 mg/dL    Albumin 3.3 (L) " 3.4 - 5.0 g/dL    Alkaline Phosphatase 48 33 - 136 U/L    Total Protein 6.6 6.4 - 8.2 g/dL    AST 15 9 - 39 U/L    Bilirubin, Total 0.4 0.0 - 1.2 mg/dL    ALT 6 (L) 10 - 52 U/L   Lactate   Result Value Ref Range    Lactate 0.8 0.4 - 2.0 mmol/L   Blood Culture    Specimen: Peripheral Venipuncture; Blood culture   Result Value Ref Range    Blood Culture Loaded on Instrument - Culture in progress    Blood Culture    Specimen: Peripheral Venipuncture; Blood culture   Result Value Ref Range    Blood Culture Loaded on Instrument - Culture in progress    Urinalysis with Reflex Microscopic   Result Value Ref Range    Color, Urine Light-Yellow Light-Yellow, Yellow, Dark-Yellow    Appearance, Urine Clear Clear    Specific Gravity, Urine 1.026 1.005 - 1.035    pH, Urine 7.5 5.0, 5.5, 6.0, 6.5, 7.0, 7.5, 8.0    Protein, Urine 20 (TRACE) NEGATIVE, 10 (TRACE), 20 (TRACE) mg/dL    Glucose, Urine Normal Normal mg/dL    Blood, Urine NEGATIVE NEGATIVE mg/dL    Ketones, Urine NEGATIVE NEGATIVE mg/dL    Bilirubin, Urine NEGATIVE NEGATIVE mg/dL    Urobilinogen, Urine Normal Normal mg/dL    Nitrite, Urine NEGATIVE NEGATIVE    Leukocyte Esterase, Urine NEGATIVE NEGATIVE   Microscopic Only, Urine   Result Value Ref Range    WBC, Urine 1-5 1-5, NONE /HPF    RBC, Urine 1-2 NONE, 1-2, 3-5 /HPF    Squamous Epithelial Cells, Urine 1-9 (SPARSE) Reference range not established. /HPF     Prior to Admission medications    Medication Sig Start Date End Date Taking? Authorizing Provider   acetaminophen (Tylenol) 325 mg suppository Insert 1 suppository (325 mg) into the rectum every 8 hours if needed for mild pain (1 - 3) or fever (temp greater than 38.0 C).    Historical Provider, MD   buPROPion SR (Wellbutrin SR) 100 mg 12 hr tablet Take 1 tablet (100 mg) by mouth 2 times a day. Do not crush, chew, or split.    Historical Provider, MD   carvedilol (Coreg) 6.25 mg tablet Take 1 tablet (6.25 mg) by mouth 2 times a day.    Historical Provider, MD    cholecalciferol (Vitamin D-3) 25 mcg (1,000 units) capsule Take 2 capsules (50 mcg) by mouth once daily.    Historical Provider, MD   cyanocobalamin (Vitamin B-12) 100 mcg tablet Take 1 tablet (100 mcg) by mouth once daily.    Tang Provider, MD   furosemide (Lasix) 20 mg tablet Take 1 tablet (20 mg) by mouth 3 times daily (morning, midday, late afternoon).    Tang Clay MD   hydrALAZINE (Apresoline) 25 mg tablet Take 1 tablet (25 mg) by mouth 3 times a day.    Tang Provider, MD   loratadine (Claritin Reditabs) 10 mg disintegrating tablet Dissolve 1 tablet (10 mg) in the mouth once daily.    Historical Provider, MD   magnesium hydroxide (Milk of Magnesia) 400 mg/5 mL suspension Take 30 mL by mouth once daily as needed for constipation.    Historical Provider, MD   nystatin (Mycostatin) 100,000 unit/gram powder Apply 1 Application topically 2 times a day.    Tang Clay MD   ondansetron (Zofran) 8 mg tablet Take 1 tablet (8 mg) by mouth every 8 hours if needed for nausea or vomiting.    Tang Provider, MD   oxyBUTYnin XL (Ditropan-XL) 10 mg 24 hr tablet Take 1 tablet (10 mg) by mouth once daily. Do not crush, chew, or split. Take at same time each day.    Tang Clay MD   polyethylene glycol (Glycolax, Miralax) 17 gram packet Take 17 g by mouth once daily.    Tang Provider, MD   potassium chloride CR 10 mEq ER tablet Take 1 tablet (10 mEq) by mouth once daily. Do not crush, chew, or split.    Tang Provider, MD   tamsulosin (Flomax) 0.4 mg 24 hr capsule Take 1 capsule (0.4 mg) by mouth once daily. Do not crush, chew, or split.    Historical Provider, MD     Current Medications[4]  Imaging  CT abdomen pelvis w IV contrast  Result Date: 6/9/2025  Thickened skin along the perineum and medial portions of both upper thighs adjusting possible cellulitis.  A 2 cm area along the inferior margin of the left buttock suggests possible very early subcutaneous abscess  formation. No concerning findings within the abdomen or pelvis. Signed by Malik Rothman MD      Cardiology, Vascular, and Other Imaging  No other imaging results found for the past 7 days    No results found for the last 90 days.       Assessment/Plan   Assessment & Plan  Wound of buttock, unspecified laterality, initial encounter  Cellulitis of bilateral lower extremity  COPD  Depression  Hypertension  Obstructive sleep apnea  Obesity  Urinary incontinence  BPH    Plan continue current medication.  Supportive care.  The patient was started on broad-spectrum IV antibiotic.  Wound care nurse consult has been obtained.  ID is on consult.  Start nystatin powder for the tenia inguinalis.  We will take DVT, fall, aspiration, decubitus, DVT precaution.  For details see the orders.        Continue current medication will get 14.  Evaluated bed.  Physical therapy and Occupational Therapy.  Monitor CBC and BMP.  Consult ID.  Consult wound care.                 Vinh Tom MD         [1]   Past Medical History:  Diagnosis Date    Anxiety     Cellulitis and abscess of lower extremity     COPD (chronic obstructive pulmonary disease) (Multi)     Depression     Hypertension     Obesity     DANELLE (obstructive sleep apnea)    [2] History reviewed. No pertinent surgical history.  [3] No family history on file.  [4]   Current Facility-Administered Medications:     acetaminophen (Tylenol) tablet 650 mg, 650 mg, oral, q4h PRN **OR** acetaminophen (Tylenol) oral liquid 650 mg, 650 mg, oral, q4h PRN **OR** [DISCONTINUED] acetaminophen (Tylenol) suppository 650 mg, 650 mg, rectal, q4h PRN, Vinh Tom MD    acetaminophen (Tylenol) suppository 325 mg, 325 mg, rectal, q8h PRN, Vinh Tom MD    benzocaine-menthol (Cepastat Sore Throat) lozenge 1 lozenge, 1 lozenge, Mouth/Throat, q2h PRN, Vinh Tom MD    buPROPion SR (Wellbutrin SR) 12 hr tablet 100 mg, 100 mg, oral, BID, Vinh Tom MD, 100 mg at  06/09/25 2047    carvedilol (Coreg) tablet 6.25 mg, 6.25 mg, oral, BID, Vinh Tom MD, 6.25 mg at 06/09/25 2047    cefepime (Maxipime) 2 g in dextrose 5% IV 50 mL, 2 g, intravenous, TID, Vinh Tom MD, Stopped at 06/09/25 2126    cetirizine (ZyrTEC) tablet 10 mg, 10 mg, oral, Daily, Vinh Tom MD    cholecalciferol (Vitamin D-3) tablet 50 mcg, 50 mcg, oral, Daily, Vinh Tom MD    cyanocobalamin (Vitamin B-12) tablet 100 mcg, 100 mcg, oral, Daily, Vinh Tom MD    dextromethorphan-guaifenesin (Robitussin DM)  mg/5 mL oral liquid 5 mL, 5 mL, oral, q4h PRN, Vinh Tom MD    furosemide (Lasix) tablet 20 mg, 20 mg, oral, TID, Vinh Tom MD, 20 mg at 06/09/25 1729    guaiFENesin (Mucinex) 12 hr tablet 600 mg, 600 mg, oral, q12h PRN, Vinh Tom MD    hydrALAZINE (Apresoline) tablet 25 mg, 25 mg, oral, TID, Vinh Tom MD, 25 mg at 06/09/25 2047    magnesium hydroxide (Milk of Magnesia) 400 mg/5 mL suspension 30 mL, 30 mL, oral, Daily PRN, Vinh Tom MD    nystatin (Mycostatin) 100,000 unit/gram powder 1 Application, 1 Application, Topical, BID, Vinh Tom MD, 1 Application at 06/09/25 2050    ondansetron ODT (Zofran-ODT) disintegrating tablet 4 mg, 4 mg, oral, q8h PRN **OR** ondansetron (Zofran) injection 4 mg, 4 mg, intravenous, q8h PRN, Vinh Tom MD    oxyBUTYnin (Ditropan) tablet 5 mg, 5 mg, oral, BID, Vinh Tom MD, 5 mg at 06/09/25 2047    polyethylene glycol (Glycolax, Miralax) packet 17 g, 17 g, oral, Daily PRN, Vinh Tom MD    polyethylene glycol (Glycolax, Miralax) packet 17 g, 17 g, oral, Daily, Vinh Tom MD    potassium chloride CR (Klor-Con) ER tablet 10 mEq, 10 mEq, oral, Daily, Vinh Tom MD    sodium chloride 0.9% infusion, 50 mL/hr, intravenous, Continuous, Jesse Stewart DO, Last Rate: 50 mL/hr at 06/10/25 0032, 50 mL/hr at 06/10/25 0032    tamsulosin  (Flomax) 24 hr capsule 0.4 mg, 0.4 mg, oral, Daily, Vinh Tom MD    vancomycin (Vancocin) pharmacy to dose - pharmacy monitoring, , miscellaneous, Daily PRN, Vinh Tom MD    vancomycin (Xellia) 1.5 g in diluent combination  mL, 1,500 mg, intravenous, q24h, Justo Lerma, PharmD

## 2025-06-10 NOTE — CARE PLAN
The patient's goals for the shift include IV antibiotics    The clinical goals for the shift include IV fluids, IV antibiotics, safety, promote wound healing, and promote rest    Problem: Pain - Adult  Goal: Verbalizes/displays adequate comfort level or baseline comfort level  Outcome: Progressing     Problem: Safety - Adult  Goal: Free from fall injury  Outcome: Progressing     Problem: Discharge Planning  Goal: Discharge to home or other facility with appropriate resources  Outcome: Progressing     Problem: Chronic Conditions and Co-morbidities  Goal: Patient's chronic conditions and co-morbidity symptoms are monitored and maintained or improved  Outcome: Progressing     Problem: Nutrition  Goal: Nutrient intake appropriate for maintaining nutritional needs  Outcome: Progressing     Problem: Skin  Goal: Decreased wound size/increased tissue granulation at next dressing change  Outcome: Progressing  Goal: Participates in plan/prevention/treatment measures  Outcome: Progressing  Goal: Prevent/manage excess moisture  Outcome: Progressing  Goal: Prevent/minimize sheer/friction injuries  Outcome: Progressing  Goal: Promote/optimize nutrition  Outcome: Progressing  Goal: Promote skin healing  Outcome: Progressing

## 2025-06-10 NOTE — PROGRESS NOTES
Spiritual Care Visit  Spiritual Care Request    Reason for Visit:  Routine Visit: Follow-up     Request Received From:       Focus of Care:  Visited With: Patient         Refer to :          Spiritual Care Assessment    Spiritual Assessment:                      Care Provided:       Sense of Community and or Scientology Affiliation:  Voodoo   Values/Beliefs  Spiritual Requests During Hospitalization: Dexter asked for a blessing today.     Addressed Needs/Concerns and/or Eugenio Through:          Outcome:        Advance Directives:         Spiritual Care Annotation    Annotation:  Dexter asked for a blessing today.  Hamilton Simon

## 2025-06-10 NOTE — ASSESSMENT & PLAN NOTE
Cellulitis of bilateral lower extremity  COPD  Depression  Hypertension  Obstructive sleep apnea  Obesity  Urinary incontinence  BPH    Plan continue current medication.  Supportive care.  The patient was started on broad-spectrum IV antibiotic.  Wound care nurse consult has been obtained.  ID is on consult.  Start nystatin powder for the tenia inguinalis.  We will take DVT, fall, aspiration, decubitus, DVT precaution.  For details see the orders.    Date of service: 06/10/2025    Plan: Continue current medication.  Continued IV antibiotics.  Blood pressure is fairly controlled.  Give supportive care.  Continue nystatin powder.  Monitor renal function panel.  Possible discharge in 2 to 3 days after the antibiotics changed to oral.  Continue local wound care at this time.  We will continue DVT, fall, aspiration, decubitus, and DVT precaution.

## 2025-06-10 NOTE — ASSESSMENT & PLAN NOTE
Cellulitis of bilateral lower extremity  COPD  Depression  Hypertension  Obstructive sleep apnea  Obesity  Urinary incontinence  BPH    Plan continue current medication.  Supportive care.  The patient was started on broad-spectrum IV antibiotic.  Wound care nurse consult has been obtained.  ID is on consult.  Start nystatin powder for the tenia inguinalis.  We will take DVT, fall, aspiration, decubitus, DVT precaution.  For details see the orders.        Continue current medication will get 14.  Evaluated bed.  Physical therapy and Occupational Therapy.  Monitor CBC and BMP.  Consult ID.  Consult wound care.

## 2025-06-11 LAB
ANION GAP SERPL CALCULATED.3IONS-SCNC: 12 MMOL/L (ref 10–20)
BASOPHILS # BLD AUTO: 0.03 X10*3/UL (ref 0–0.1)
BASOPHILS NFR BLD AUTO: 0.4 %
BUN SERPL-MCNC: 26 MG/DL (ref 6–23)
CALCIUM SERPL-MCNC: 7.9 MG/DL (ref 8.6–10.3)
CHLORIDE SERPL-SCNC: 110 MMOL/L (ref 98–107)
CO2 SERPL-SCNC: 19 MMOL/L (ref 21–32)
CREAT SERPL-MCNC: 1.06 MG/DL (ref 0.5–1.3)
EGFRCR SERPLBLD CKD-EPI 2021: 71 ML/MIN/1.73M*2
EOSINOPHIL # BLD AUTO: 0.19 X10*3/UL (ref 0–0.4)
EOSINOPHIL NFR BLD AUTO: 2.7 %
ERYTHROCYTE [DISTWIDTH] IN BLOOD BY AUTOMATED COUNT: 16.6 % (ref 11.5–14.5)
GLUCOSE SERPL-MCNC: 89 MG/DL (ref 74–99)
HCT VFR BLD AUTO: 32.2 % (ref 41–52)
HGB BLD-MCNC: 9.8 G/DL (ref 13.5–17.5)
IMM GRANULOCYTES # BLD AUTO: 0.14 X10*3/UL (ref 0–0.5)
IMM GRANULOCYTES NFR BLD AUTO: 2 % (ref 0–0.9)
LYMPHOCYTES # BLD AUTO: 0.93 X10*3/UL (ref 0.8–3)
LYMPHOCYTES NFR BLD AUTO: 13.2 %
MCH RBC QN AUTO: 30.9 PG (ref 26–34)
MCHC RBC AUTO-ENTMCNC: 30.4 G/DL (ref 32–36)
MCV RBC AUTO: 102 FL (ref 80–100)
MONOCYTES # BLD AUTO: 0.92 X10*3/UL (ref 0.05–0.8)
MONOCYTES NFR BLD AUTO: 13 %
NEUTROPHILS # BLD AUTO: 4.84 X10*3/UL (ref 1.6–5.5)
NEUTROPHILS NFR BLD AUTO: 68.7 %
NRBC BLD-RTO: 0 /100 WBCS (ref 0–0)
PLATELET # BLD AUTO: 238 X10*3/UL (ref 150–450)
POTASSIUM SERPL-SCNC: 4.1 MMOL/L (ref 3.5–5.3)
RBC # BLD AUTO: 3.17 X10*6/UL (ref 4.5–5.9)
SODIUM SERPL-SCNC: 137 MMOL/L (ref 136–145)
VANCOMYCIN SERPL-MCNC: 12.8 UG/ML (ref 5–20)
WBC # BLD AUTO: 7.1 X10*3/UL (ref 4.4–11.3)

## 2025-06-11 PROCEDURE — 80202 ASSAY OF VANCOMYCIN: CPT

## 2025-06-11 PROCEDURE — 80048 BASIC METABOLIC PNL TOTAL CA: CPT | Performed by: INTERNAL MEDICINE

## 2025-06-11 PROCEDURE — 2500000001 HC RX 250 WO HCPCS SELF ADMINISTERED DRUGS (ALT 637 FOR MEDICARE OP): Performed by: INTERNAL MEDICINE

## 2025-06-11 PROCEDURE — 2500000004 HC RX 250 GENERAL PHARMACY W/ HCPCS (ALT 636 FOR OP/ED)

## 2025-06-11 PROCEDURE — 2500000002 HC RX 250 W HCPCS SELF ADMINISTERED DRUGS (ALT 637 FOR MEDICARE OP, ALT 636 FOR OP/ED): Performed by: INTERNAL MEDICINE

## 2025-06-11 PROCEDURE — 2500000004 HC RX 250 GENERAL PHARMACY W/ HCPCS (ALT 636 FOR OP/ED): Performed by: INTERNAL MEDICINE

## 2025-06-11 PROCEDURE — 1100000001 HC PRIVATE ROOM DAILY

## 2025-06-11 PROCEDURE — 85025 COMPLETE CBC W/AUTO DIFF WBC: CPT | Performed by: INTERNAL MEDICINE

## 2025-06-11 PROCEDURE — 36415 COLL VENOUS BLD VENIPUNCTURE: CPT | Performed by: INTERNAL MEDICINE

## 2025-06-11 PROCEDURE — 97530 THERAPEUTIC ACTIVITIES: CPT | Mod: GO

## 2025-06-11 RX ADMIN — VANCOMYCIN 2000 MG: 2 INJECTION, SOLUTION INTRAVENOUS at 09:09

## 2025-06-11 RX ADMIN — OXYBUTYNIN CHLORIDE 5 MG: 5 TABLET ORAL at 21:07

## 2025-06-11 RX ADMIN — OXYBUTYNIN CHLORIDE 5 MG: 5 TABLET ORAL at 08:46

## 2025-06-11 RX ADMIN — CEFEPIME HYDROCHLORIDE 2 G: 2 INJECTION, SOLUTION INTRAVENOUS at 21:06

## 2025-06-11 RX ADMIN — POLYETHYLENE GLYCOL 3350 17 G: 17 POWDER, FOR SOLUTION ORAL at 08:46

## 2025-06-11 RX ADMIN — HYDRALAZINE HYDROCHLORIDE 25 MG: 25 TABLET ORAL at 08:46

## 2025-06-11 RX ADMIN — HYDRALAZINE HYDROCHLORIDE 25 MG: 25 TABLET ORAL at 21:07

## 2025-06-11 RX ADMIN — CETIRIZINE HYDROCHLORIDE 10 MG: 10 TABLET, FILM COATED ORAL at 08:46

## 2025-06-11 RX ADMIN — NYSTATIN 1 APPLICATION: 100000 POWDER TOPICAL at 08:52

## 2025-06-11 RX ADMIN — TAMSULOSIN HYDROCHLORIDE 0.4 MG: 0.4 CAPSULE ORAL at 08:46

## 2025-06-11 RX ADMIN — CEFEPIME HYDROCHLORIDE 2 G: 2 INJECTION, SOLUTION INTRAVENOUS at 05:43

## 2025-06-11 RX ADMIN — Medication 50 MCG: at 08:45

## 2025-06-11 RX ADMIN — CEFEPIME HYDROCHLORIDE 2 G: 2 INJECTION, SOLUTION INTRAVENOUS at 14:22

## 2025-06-11 RX ADMIN — BUPROPION HYDROCHLORIDE 100 MG: 100 TABLET, EXTENDED RELEASE ORAL at 21:07

## 2025-06-11 RX ADMIN — CARVEDILOL 6.25 MG: 6.25 TABLET, FILM COATED ORAL at 21:07

## 2025-06-11 RX ADMIN — FUROSEMIDE 20 MG: 20 TABLET ORAL at 08:45

## 2025-06-11 RX ADMIN — POTASSIUM CHLORIDE 10 MEQ: 750 TABLET, EXTENDED RELEASE ORAL at 08:46

## 2025-06-11 RX ADMIN — NYSTATIN 1 APPLICATION: 100000 POWDER TOPICAL at 21:07

## 2025-06-11 RX ADMIN — FUROSEMIDE 20 MG: 20 TABLET ORAL at 11:22

## 2025-06-11 RX ADMIN — BUPROPION HYDROCHLORIDE 100 MG: 100 TABLET, EXTENDED RELEASE ORAL at 08:46

## 2025-06-11 RX ADMIN — FUROSEMIDE 20 MG: 20 TABLET ORAL at 16:29

## 2025-06-11 RX ADMIN — VITAM B12 100 MCG: 100 TAB at 08:52

## 2025-06-11 RX ADMIN — CARVEDILOL 6.25 MG: 6.25 TABLET, FILM COATED ORAL at 08:45

## 2025-06-11 RX ADMIN — HYDRALAZINE HYDROCHLORIDE 25 MG: 25 TABLET ORAL at 14:26

## 2025-06-11 ASSESSMENT — COGNITIVE AND FUNCTIONAL STATUS - GENERAL
PERSONAL GROOMING: A LOT
HELP NEEDED FOR BATHING: A LOT
DRESSING REGULAR UPPER BODY CLOTHING: A LOT
DRESSING REGULAR LOWER BODY CLOTHING: A LOT
EATING MEALS: A LITTLE
WALKING IN HOSPITAL ROOM: A LOT
CLIMB 3 TO 5 STEPS WITH RAILING: A LOT
DAILY ACTIVITIY SCORE: 14
MOVING FROM LYING ON BACK TO SITTING ON SIDE OF FLAT BED WITH BEDRAILS: A LOT
PERSONAL GROOMING: A LITTLE
DRESSING REGULAR LOWER BODY CLOTHING: A LOT
MOVING TO AND FROM BED TO CHAIR: A LOT
STANDING UP FROM CHAIR USING ARMS: A LOT
EATING MEALS: A LITTLE
HELP NEEDED FOR BATHING: A LOT
TOILETING: A LOT
TURNING FROM BACK TO SIDE WHILE IN FLAT BAD: A LOT
DAILY ACTIVITIY SCORE: 13
DRESSING REGULAR UPPER BODY CLOTHING: A LITTLE
TOILETING: TOTAL
MOBILITY SCORE: 12

## 2025-06-11 ASSESSMENT — PAIN - FUNCTIONAL ASSESSMENT: PAIN_FUNCTIONAL_ASSESSMENT: 0-10

## 2025-06-11 ASSESSMENT — PAIN SCALES - GENERAL
PAINLEVEL_OUTOF10: 0 - NO PAIN

## 2025-06-11 NOTE — PROGRESS NOTES
Spiritual Care Visit  Spiritual Care Request    Reason for Visit:  Routine Visit: Follow-up     Request Received From:       Focus of Care:  Visited With: Patient         Refer to :          Spiritual Care Assessment    Spiritual Assessment:                      Care Provided:       Sense of Community and or Caodaism Affiliation:  Jehovah's witness   Values/Beliefs  Spiritual Requests During Hospitalization: Dexter asked for Communion today.     Addressed Needs/Concerns and/or Eugenio Through:     Sacramental Encounters  Communion: Patient wants communion  Communion Given Indicator: Yes    Outcome:        Advance Directives:         Spiritual Care Annotation    Annotation:  Dexter asked for Communion today.  ,Hamilton Simon

## 2025-06-11 NOTE — PROGRESS NOTES
Occupational Therapy Treatment    Name: Dexter Avalos  MRN: [id number]  Department: [address]  Room: [address] [place] [address]  Date: 06/11/25  Time Calculation  Start Time: 0810  Stop Time: 0824  Time Calculation (min): 14 min    Assessment:  OT Assessment: Pt demonstrated progress toward established goals and was receptive to therapy and instructions throughout.  Session terminated d/t arrival of breakfast tray.  Pt would benefit from continued OT skilled services d/t potential for further gains.  Prognosis: Good  Barriers to Discharge Home: Physical needs  Physical Needs: 24hr ADL assistance needed, 24hr mobility assistance needed, Returning to long-term care/other facility  Evaluation/Treatment Tolerance: Patient tolerated treatment well  Medical Staff Made Aware: Yes  End of Session Communication: PCT/NA/CTA  End of Session Patient Position: Up in chair, Alarm on    Plan:  Treatment Interventions: Functional transfer training, Patient/family training  OT Frequency: 3 times per week  OT Discharge Recommendations: Low intensity level of continued care  Equipment Recommended upon Discharge: Wheeled walker  OT Recommended Transfer Status: Assist of 2  OT - OK to Discharge: Yes      Subjective   OT Visit Info:  OT Received On: 06/11/25    General:  General  Reason for Referral: decreased ADL's d/t wound of buttock  Past Medical History Relevant to Rehab: Anxiety  Cellulitis and abscess of lower extremity  COPD (chronic obstructive pulmonary disease) (Multi)  Depression  Hypertension  Obesity  DANELLE (obstructive sleep apnea)  Family/Caregiver Present: No  Prior to Session Communication: Bedside nurse  Patient Position Received: Bed, 3 rail up, Alarm off, caregiver present  Preferred Learning Style: verbal, visual  General Comment: Cleared by nursing and agreeable for therapy.    Precautions:  Medical Precautions: Fall precautions    Pain Assessment:  Pain Assessment  Pain Assessment: 0-10  0-10 (Numeric) Pain Score: 0 - No  pain    Objective   Cognition:  Overall Cognitive Status: Within Functional Limits  Orientation Level: Oriented X4  Cognition Comments: Patient anxious but redirectable    Functional Standing Tolerance:  Functional Standing Tolerance  Time: 1 minute  Activity: hygiene buttocks  Functional Standing Tolerance Comments: RW BUE support    Bed Mobility/Transfers: Bed Mobility 1  Bed Mobility 1: Supine to sitting  Level of Assistance 1: Moderate assistance  Bed Mobility Comments 1: heavy assist with trunk and to scoot toward the edge of bed of bed with head elevated ~40 degrees and with increased time d/t effortful    Transfers  Transfer: Yes  Transfer 1  Transfer From 1: Bed to  Transfer to 1: Chair with arms  Technique 1: Stand pivot, To left  Transfer Device 1: Walker  Transfer Level of Assistance 1: Minimum assistance, +2  Trials/Comments 1: from elevated bed height with cues for hand placement and utilizing walker safely    Outcome Measures:  [facility] Daily Activity  Putting on and taking off regular lower body clothing: A lot  Bathing (including washing, rinsing, drying): A lot  Putting on and taking off regular upper body clothing: A little  Toileting, which includes using toilet, bedpan or urinal: Total  Taking care of personal grooming such as brushing teeth: A little  Eating Meals: A little  Daily Activity - Total Score: 14    Education Documentation  ADL Training, taught by Renée Knox OT at 6/11/2025  9:56 AM.  Learner: Patient  Readiness: Acceptance  Method: Explanation  Response: Demonstrated Understanding  Comment: Functional transfer/mobility retraining to maximize functional capacity and safety    Goals:  Encounter Problems       Encounter Problems (Active)       OT Goals       ADL's (Progressing)       Start:  06/09/25    Expected End:  06/23/25       Patient will complete ADL tasks, with minimal assist  using AE need in order to increase patient's safety and independence with self-care tasks.            Functional transfers (Progressing)       Start:  06/09/25    Expected End:  06/23/25       Patient will complete functional transfers with contact guard assist using least restrictive device, in order to increase patient's safety and independence with daily tasks.           Activity tolerance (Progressing)       Start:  06/09/25    Expected End:  06/23/25       Patient will demonstrate the ability to participate in functional activity at least >/= 25 minutes in order to increase patient's safety and independence with daily tasks.

## 2025-06-11 NOTE — CARE PLAN
Problem: Pain - Adult  Goal: Verbalizes/displays adequate comfort level or baseline comfort level  Outcome: Progressing     Problem: Safety - Adult  Goal: Free from fall injury  Outcome: Progressing     Problem: Discharge Planning  Goal: Discharge to home or other facility with appropriate resources  Outcome: Progressing     Problem: Chronic Conditions and Co-morbidities  Goal: Patient's chronic conditions and co-morbidity symptoms are monitored and maintained or improved  Outcome: Progressing     Problem: Nutrition  Goal: Nutrient intake appropriate for maintaining nutritional needs  Outcome: Progressing     Problem: Skin  Goal: Decreased wound size/increased tissue granulation at next dressing change  Outcome: Progressing  Goal: Participates in plan/prevention/treatment measures  Outcome: Progressing  Goal: Prevent/manage excess moisture  Outcome: Progressing  Goal: Prevent/minimize sheer/friction injuries  Outcome: Progressing  Goal: Promote/optimize nutrition  Outcome: Progressing  Goal: Promote skin healing  Outcome: Progressing   The patient's goals for the shift include IV antibiotics    The clinical goals for the shift include Pt will tolerate working with PT/OT today. Pt will tolerate dressing changes and perform interventions to help promote wound healing

## 2025-06-11 NOTE — PROGRESS NOTES
Dexter Avalos is a 79 y.o. male on day 2 of admission presenting with Wound of buttock, unspecified laterality, initial encounter.    Subjective   Interval History:   Skin fold rash improving   Afebrile, no chills  No shortness of breath cough or chest pain   No nausea, vomiting or diarrhea   Right leg swelling slight improvement     Objective   Range of Vitals (last 24 hours)  Heart Rate:  [59-76]   Temp:  [36.7 °C (98.1 °F)-37.5 °C (99.5 °F)]   Resp:  [16-19]   BP: (121-144)/(54-62)   SpO2:  [93 %-99 %]   Daily Weight  06/09/25 : 134 kg (296 lb)    Body mass index is 42.47 kg/m².    Physical Exam  Constitutional:       Appearance: Normal appearance.   HENT:      Head: Normocephalic and atraumatic.      Nose: Nose normal.   Eyes:      Extraocular Movements: Extraocular movements intact.      Conjunctiva/sclera: Conjunctivae normal.   Cardiovascular:      Rate and Rhythm: Normal rate and regular rhythm.   Pulmonary:      Effort: Pulmonary effort is normal.      Breath sounds: Normal breath sounds.   Abdominal:      General: Bowel sounds are normal.      Palpations: Abdomen is soft.   Musculoskeletal:         General: Normal range of motion.      Cervical back: Normal range of motion and neck supple.   Skin:     General: Skin is warm and dry.      Comments:  Intertrigo bilateral under breast, bilateral groin-improving  Bilateral leg wrapping in tact    Neurological:      General: No focal deficit present.      Mental Status: He is alert and oriented to person, place, and time. Mental status is at baseline.   Psychiatric:         Mood and Affect: Mood normal.         Behavior: Behavior normal.           Antibiotics  cefepime - 2 gram/50 mL  vancomycin - 2 gram/400 mL    Relevant Results  Labs  Results from last 72 hours   Lab Units 06/11/25  0429 06/10/25  0455 06/09/25  0500   WBC AUTO x10*3/uL 7.1 7.2 7.3   HEMOGLOBIN g/dL 9.8* 9.6* 10.4*   HEMATOCRIT % 32.2* 31.1* 33.2*   PLATELETS AUTO x10*3/uL 238 256 281  "  NEUTROS PCT AUTO % 68.7  --  73.0   LYMPHS PCT AUTO % 13.2  --  11.8   MONOS PCT AUTO % 13.0  --  11.4   EOS PCT AUTO % 2.7  --  2.3     Results from last 72 hours   Lab Units 06/11/25  0429 06/10/25  0455 06/09/25  0500   SODIUM mmol/L 137 141 139   POTASSIUM mmol/L 4.1 3.8 3.7   CHLORIDE mmol/L 110* 111* 104   CO2 mmol/L 19* 23 29   BUN mg/dL 26* 23 26*   CREATININE mg/dL 1.06 1.11 1.33*   GLUCOSE mg/dL 89 97 96   CALCIUM mg/dL 7.9* 7.8* 8.4*   ANION GAP mmol/L 12 11 10   EGFR mL/min/1.73m*2 71 67 54*     Results from last 72 hours   Lab Units 06/10/25  0455 06/09/25  0500   ALK PHOS U/L 41 48   BILIRUBIN TOTAL mg/dL 0.3 0.4   PROTEIN TOTAL g/dL 5.4* 6.6   ALT U/L 4* 6*   AST U/L 10 15   ALBUMIN g/dL 2.8* 3.3*     Estimated Creatinine Clearance: 75.3 mL/min (by C-G formula based on SCr of 1.06 mg/dL).  No results found for: \"CRP\"  Microbiology  Blood culture pending       Imaging  CT abdomen pelvis w IV contrast  Result Date: 6/9/2025  STUDY: CT Abdomen and Pelvis with IV Contrast; 6/09/2025 6:51 AM INDICATION: Perineal pressure wound. COMPARISON: CT AP 5/22/2022. ACCESSION NUMBER(S): [id number] ORDERING CLINICIAN: NIKKI JACOBSEN TECHNIQUE: CT of the abdomen and pelvis was performed.  Contiguous axial images were obtained at 3 mm slice thickness through the abdomen and pelvis. Coronal and sagittal reconstructions at 3 mm slice thickness were performed.  Omnipaque 350 75 mL was administered intravenously.  FINDINGS: Abdomen: The lung bases are clear.  The liver is normal.  The pancreas, spleen, adrenal glands, and kidneys demonstrate no acute pathology. A few simple appearing right renal cysts measure up to 3.6 cm.  Simple appearing 5 cm left renal cyst. There is no free air or lymph node enlargement.  Abdominal aorta is not aneurysmal.  Small fat-containing umbilical hernia defect. Pelvis: There is no bowel wall thickening or obstruction.  Large overall stool burden.  There is no free fluid.  Lymph nodes are " not enlarged. Urinary bladder is unremarkable. Thickened skin of the perineum and upper, medial thighs.  Along the inferior margin of the left buttock there is a 2 cm area of focal fat stranding and likely trace fluid.  Best seen on axial image 161 and coronal image 107. Skeleton:  There are no acute fractures.  No suspicious bony lesions.  Moderate to severe degenerative changes of the lumbar spine.    Thickened skin along the perineum and medial portions of both upper thighs adjusting possible cellulitis.  A 2 cm area along the inferior margin of the left buttock suggests possible very early subcutaneous abscess formation. No concerning findings within the abdomen or pelvis. Signed by Malik Rothman MD            Assessment/Plan   Lower extremity cellulitis, slowly resolving   Cutaneous candidiasis-skin folds-resolving   Abnormal CT-left buttock possible early abscess formation -monitor   Bilateral lower extremity lymphedema  Allergy penicillin, sulfa-tolerating cefepime        IV Cefepime  IV vancomycin-monitor levels  Nystatin powder   Follow up Blood cultures  Monitor renal function  Monitor response to therapy   Local care  Leg elevation   Evaluate for de-escalation in 1-2 days     Yisel Lui, APRN-CNP

## 2025-06-11 NOTE — PROGRESS NOTES
06/11/25 1136   Discharge Planning   Expected Discharge Disposition Inter  (long term care - [facility])   Does the patient need discharge transport arranged? Yes   RoundTrip coordination needed? Yes   Has discharge transport been arranged? No     Home to [facility] at discharge.

## 2025-06-11 NOTE — PROGRESS NOTES
Vancomycin Dosing by Pharmacy- FOLLOW UP    Dexter Avalos is a 79 y.o. year old male who Pharmacy has been consulted for vancomycin dosing for cellulitis, skin and soft tissue. Based on the patient's indication and renal status this patient is being dosed based on a goal AUC of 400-600.     Renal function is currently stable.    Current vancomycin dose: 2000 mg given every 24 hours    Estimated vancomycin AUC on current dose: 590 mg/L.hr     Visit Vitals  /54 (BP Location: Left arm, Patient Position: Lying)   Pulse 76   Temp 37.1 °C (98.8 °F) (Temporal)   Resp 19        Lab Results   Component Value Date    CREATININE 1.06 2025    CREATININE 1.11 06/10/2025    CREATININE 1.33 (H) 2025    CREATININE 1.45 (H) 10/12/2024        Patient weight is as follows:   Vitals:    25 0442   Weight: 134 kg (296 lb)       Cultures:  No results found for the encounter in last 14 days.       I/O last 3 completed shifts:  In: 2901.7 (21.6 mL/kg) [P.O.:1600; I.V.:651.7 (4.9 mL/kg); IV Piggyback:650]  Out: 4575 (34.1 mL/kg) [Urine:4575 (0.9 mL/kg/hr)]  Weight: 134.3 kg   I/O during current shift:  No intake/output data recorded.    Temp (24hrs), Av.2 °C (98.9 °F), Min:36.7 °C (98.1 °F), Max:37.5 °C (99.5 °F)      Assessment/Plan    Within goal AUC range. Continue current vancomycin regimen.    This dosing regimen is predicted by [employer] to result in the following pharmacokinetic parameters:    Regimen: 2000 mg IV every 24 hours.  Start time: 09:58 on 2025  Exposure target: AUC24 (range) 400-600 mg/L.hr   AHR69-03: 549 mg/L.hr  AUC24,ss: 590 mg/L.hr  Probability of AUC24 > 400: 100 %  Ctrough,ss: 16.6 mg/L  Probability of Ctrough,ss > 20: 9 %      The next level will be obtained on  at 0500. May be obtained sooner if clinically indicated.   Will continue to monitor renal function daily while on vancomycin and order serum creatinine at least every 48 hours if not already ordered.  Follow for  continued vancomycin needs, clinical response, and signs/symptoms of toxicity.       Deon Flannery, PharmD

## 2025-06-11 NOTE — CARE PLAN
The patient's goals for the shift include pain control and rest    The clinical goals for the shift include IV antibiotics, pain management, monitor for infection, and promote rest      06/10/25 at 10:47 PM - Diana Kebede RN

## 2025-06-12 PROCEDURE — 2500000001 HC RX 250 WO HCPCS SELF ADMINISTERED DRUGS (ALT 637 FOR MEDICARE OP): Performed by: INTERNAL MEDICINE

## 2025-06-12 PROCEDURE — 2500000004 HC RX 250 GENERAL PHARMACY W/ HCPCS (ALT 636 FOR OP/ED)

## 2025-06-12 PROCEDURE — 2500000004 HC RX 250 GENERAL PHARMACY W/ HCPCS (ALT 636 FOR OP/ED): Performed by: INTERNAL MEDICINE

## 2025-06-12 PROCEDURE — 2500000004 HC RX 250 GENERAL PHARMACY W/ HCPCS (ALT 636 FOR OP/ED): Performed by: NURSE PRACTITIONER

## 2025-06-12 PROCEDURE — 1100000001 HC PRIVATE ROOM DAILY

## 2025-06-12 PROCEDURE — 2500000002 HC RX 250 W HCPCS SELF ADMINISTERED DRUGS (ALT 637 FOR MEDICARE OP, ALT 636 FOR OP/ED): Performed by: INTERNAL MEDICINE

## 2025-06-12 RX ORDER — IPRATROPIUM BROMIDE AND ALBUTEROL SULFATE 2.5; .5 MG/3ML; MG/3ML
3 SOLUTION RESPIRATORY (INHALATION) EVERY 8 HOURS PRN
Status: DISCONTINUED | OUTPATIENT
Start: 2025-06-12 | End: 2025-06-13 | Stop reason: HOSPADM

## 2025-06-12 RX ORDER — CEFAZOLIN SODIUM 2 G/100ML
2 INJECTION, SOLUTION INTRAVENOUS EVERY 8 HOURS
Status: DISCONTINUED | OUTPATIENT
Start: 2025-06-12 | End: 2025-06-13 | Stop reason: HOSPADM

## 2025-06-12 RX ORDER — CEPHALEXIN 500 MG/1
500 CAPSULE ORAL EVERY 6 HOURS
Start: 2025-06-12 | End: 2025-06-18

## 2025-06-12 RX ORDER — HYDRALAZINE HYDROCHLORIDE 25 MG/1
25 TABLET, FILM COATED ORAL EVERY 6 HOURS PRN
Status: DISCONTINUED | OUTPATIENT
Start: 2025-06-12 | End: 2025-06-12

## 2025-06-12 RX ORDER — HYDROXYZINE HYDROCHLORIDE 25 MG/1
25 TABLET, FILM COATED ORAL EVERY 6 HOURS PRN
Status: DISCONTINUED | OUTPATIENT
Start: 2025-06-12 | End: 2025-06-13 | Stop reason: HOSPADM

## 2025-06-12 RX ADMIN — VANCOMYCIN 2000 MG: 2 INJECTION, SOLUTION INTRAVENOUS at 09:57

## 2025-06-12 RX ADMIN — POLYETHYLENE GLYCOL 3350 17 G: 17 POWDER, FOR SOLUTION ORAL at 08:07

## 2025-06-12 RX ADMIN — HYDRALAZINE HYDROCHLORIDE 25 MG: 25 TABLET ORAL at 15:21

## 2025-06-12 RX ADMIN — BUPROPION HYDROCHLORIDE 100 MG: 100 TABLET, EXTENDED RELEASE ORAL at 21:24

## 2025-06-12 RX ADMIN — CARVEDILOL 6.25 MG: 6.25 TABLET, FILM COATED ORAL at 21:24

## 2025-06-12 RX ADMIN — CARVEDILOL 6.25 MG: 6.25 TABLET, FILM COATED ORAL at 08:06

## 2025-06-12 RX ADMIN — CETIRIZINE HYDROCHLORIDE 10 MG: 10 TABLET, FILM COATED ORAL at 08:06

## 2025-06-12 RX ADMIN — HYDRALAZINE HYDROCHLORIDE 25 MG: 25 TABLET ORAL at 21:24

## 2025-06-12 RX ADMIN — POTASSIUM CHLORIDE 10 MEQ: 750 TABLET, EXTENDED RELEASE ORAL at 08:06

## 2025-06-12 RX ADMIN — TAMSULOSIN HYDROCHLORIDE 0.4 MG: 0.4 CAPSULE ORAL at 08:06

## 2025-06-12 RX ADMIN — NYSTATIN 1 APPLICATION: 100000 POWDER TOPICAL at 08:07

## 2025-06-12 RX ADMIN — Medication 50 MCG: at 08:06

## 2025-06-12 RX ADMIN — CEFAZOLIN SODIUM 2 G: 2 INJECTION, SOLUTION INTRAVENOUS at 21:24

## 2025-06-12 RX ADMIN — FUROSEMIDE 20 MG: 20 TABLET ORAL at 11:56

## 2025-06-12 RX ADMIN — OXYBUTYNIN CHLORIDE 5 MG: 5 TABLET ORAL at 08:06

## 2025-06-12 RX ADMIN — OXYBUTYNIN CHLORIDE 5 MG: 5 TABLET ORAL at 21:24

## 2025-06-12 RX ADMIN — CEFEPIME HYDROCHLORIDE 2 G: 2 INJECTION, SOLUTION INTRAVENOUS at 05:25

## 2025-06-12 RX ADMIN — FUROSEMIDE 20 MG: 20 TABLET ORAL at 08:06

## 2025-06-12 RX ADMIN — BUPROPION HYDROCHLORIDE 100 MG: 100 TABLET, EXTENDED RELEASE ORAL at 08:07

## 2025-06-12 RX ADMIN — HYDRALAZINE HYDROCHLORIDE 25 MG: 25 TABLET ORAL at 08:06

## 2025-06-12 RX ADMIN — NYSTATIN 1 APPLICATION: 100000 POWDER TOPICAL at 21:24

## 2025-06-12 RX ADMIN — CEFAZOLIN SODIUM 2 G: 2 INJECTION, SOLUTION INTRAVENOUS at 13:01

## 2025-06-12 RX ADMIN — FUROSEMIDE 20 MG: 20 TABLET ORAL at 16:30

## 2025-06-12 ASSESSMENT — PAIN - FUNCTIONAL ASSESSMENT: PAIN_FUNCTIONAL_ASSESSMENT: 0-10

## 2025-06-12 ASSESSMENT — PAIN SCALES - GENERAL
PAINLEVEL_OUTOF10: 0 - NO PAIN
PAINLEVEL_OUTOF10: 0 - NO PAIN

## 2025-06-12 NOTE — PROGRESS NOTES
Dexter Avalos is a 79 y.o. male on day 3 of admission presenting with Wound of buttock, unspecified laterality, initial encounter.    Subjective   Interval History:   Feeling better   Afebrile, no chills  No shortness of breath cough or chest pain   No nausea, vomiting or diarrhea   Interval improvement right leg swelling and erythema     Objective   Range of Vitals (last 24 hours)  Heart Rate:  [74-75]   Temp:  [36.4 °C (97.5 °F)-37 °C (98.6 °F)]   Resp:  [15-20]   BP: (119-143)/(65-71)   SpO2:  [97 %-98 %]   Daily Weight  06/09/25 : 134 kg (296 lb)    Body mass index is 42.47 kg/m².    Physical Exam  Constitutional:       Appearance: Normal appearance.   HENT:      Head: Normocephalic and atraumatic.      Nose: Nose normal.   Eyes:      Extraocular Movements: Extraocular movements intact.      Conjunctiva/sclera: Conjunctivae normal.   Cardiovascular:      Rate and Rhythm: Normal rate and regular rhythm.   Pulmonary:      Effort: Pulmonary effort is normal.      Breath sounds: Normal breath sounds.   Abdominal:      General: Bowel sounds are normal.      Palpations: Abdomen is soft.   Musculoskeletal:         General: Normal range of motion.      Cervical back: Normal range of motion and neck supple.   Skin:     General: Skin is warm and dry.      Comments:  Intertrigo bilateral under breast, bilateral groin-improving  Bilateral leg wrapping intact    Neurological:      General: No focal deficit present.      Mental Status: He is alert and oriented to person, place, and time. Mental status is at baseline.   Psychiatric:         Mood and Affect: Mood normal.         Behavior: Behavior normal.           Antibiotics  cefepime - 2 gram/50 mL  vancomycin - 2 gram/400 mL    Relevant Results  Labs  Results from last 72 hours   Lab Units 06/11/25  0429 06/10/25  0455   WBC AUTO x10*3/uL 7.1 7.2   HEMOGLOBIN g/dL 9.8* 9.6*   HEMATOCRIT % 32.2* 31.1*   PLATELETS AUTO x10*3/uL 238 256   NEUTROS PCT AUTO % 68.7  --    LYMPHS  "PCT AUTO % 13.2  --    MONOS PCT AUTO % 13.0  --    EOS PCT AUTO % 2.7  --      Results from last 72 hours   Lab Units 06/11/25  0429 06/10/25  0455   SODIUM mmol/L 137 141   POTASSIUM mmol/L 4.1 3.8   CHLORIDE mmol/L 110* 111*   CO2 mmol/L 19* 23   BUN mg/dL 26* 23   CREATININE mg/dL 1.06 1.11   GLUCOSE mg/dL 89 97   CALCIUM mg/dL 7.9* 7.8*   ANION GAP mmol/L 12 11   EGFR mL/min/1.73m*2 71 67     Results from last 72 hours   Lab Units 06/10/25  0455   ALK PHOS U/L 41   BILIRUBIN TOTAL mg/dL 0.3   PROTEIN TOTAL g/dL 5.4*   ALT U/L 4*   AST U/L 10   ALBUMIN g/dL 2.8*     Estimated Creatinine Clearance: 75.3 mL/min (by C-G formula based on SCr of 1.06 mg/dL).  No results found for: \"CRP\"  Microbiology  Blood culture pending       Imaging  CT abdomen pelvis w IV contrast  Result Date: 6/9/2025  STUDY: CT Abdomen and Pelvis with IV Contrast; 6/09/2025 6:51 AM INDICATION: Perineal pressure wound. COMPARISON: CT AP 5/22/2022. ACCESSION NUMBER(S): [id number] ORDERING CLINICIAN: NIKKI JACOBSEN TECHNIQUE: CT of the abdomen and pelvis was performed.  Contiguous axial images were obtained at 3 mm slice thickness through the abdomen and pelvis. Coronal and sagittal reconstructions at 3 mm slice thickness were performed.  Omnipaque 350 75 mL was administered intravenously.  FINDINGS: Abdomen: The lung bases are clear.  The liver is normal.  The pancreas, spleen, adrenal glands, and kidneys demonstrate no acute pathology. A few simple appearing right renal cysts measure up to 3.6 cm.  Simple appearing 5 cm left renal cyst. There is no free air or lymph node enlargement.  Abdominal aorta is not aneurysmal.  Small fat-containing umbilical hernia defect. Pelvis: There is no bowel wall thickening or obstruction.  Large overall stool burden.  There is no free fluid.  Lymph nodes are not enlarged. Urinary bladder is unremarkable. Thickened skin of the perineum and upper, medial thighs.  Along the inferior margin of the left buttock " there is a 2 cm area of focal fat stranding and likely trace fluid.  Best seen on axial image 161 and coronal image 107. Skeleton:  There are no acute fractures.  No suspicious bony lesions.  Moderate to severe degenerative changes of the lumbar spine.    Thickened skin along the perineum and medial portions of both upper thighs adjusting possible cellulitis.  A 2 cm area along the inferior margin of the left buttock suggests possible very early subcutaneous abscess formation. No concerning findings within the abdomen or pelvis. Signed by Malik Rothman MD            Assessment/Plan   Lower extremity cellulitis, slowly resolving   Cutaneous candidiasis-skin folds-resolving   Abnormal CT-left buttock possible early abscess formation -monitor   Bilateral lower extremity lymphedema  Allergy penicillin, sulfa-tolerating cefepime        IV Cefepime-de-escalate to IV cefazolin   Discontinue IV vancomycin-monitor levels  Nystatin powder   Follow up Blood cultures  Monitor renal function  Monitor response to therapy   Local care  Leg elevation   Long term plan po keflex for total 10 days till 6/18/2025-see discharge rec     LILY Hung-CNP

## 2025-06-12 NOTE — PROGRESS NOTES
06/12/25 1511   Discharge Planning   Expected Discharge Disposition Inter   Does the patient need discharge transport arranged? Yes   RoundTrip coordination needed? Yes   Has discharge transport been arranged? No     Return [facility].

## 2025-06-12 NOTE — ASSESSMENT & PLAN NOTE
Cellulitis of bilateral lower extremity  COPD  Depression  Hypertension  Obstructive sleep apnea  Obesity  Urinary incontinence  BPH    Plan continue current medication.  Supportive care.  The patient was started on broad-spectrum IV antibiotic.  Wound care nurse consult has been obtained.  ID is on consult.  Start nystatin powder for the tenia inguinalis.  We will take DVT, fall, aspiration, decubitus, DVT precaution.  For details see the orders.    Date of service: 06/10/2025    Plan: Continue current medication.  Continued IV antibiotics.  Blood pressure is fairly controlled.  Give supportive care.  Continue nystatin powder.  Monitor renal function panel.  Possible discharge in 2 to 3 days after the antibiotics changed to oral.  Continue local wound care at this time.  We will continue DVT, fall, aspiration, decubitus, and DVT precaution.    Date of service  06/11/2025     Plan  Continue current medication.  Give supportive care.  Continue with the IV antibiotics patient was improving feeling better as compared to yesterday.  Renal function slightly better.  Patient was hemoglobin hematocrit low but stable.  Continue to monitor at this time.  IV antibiotics per ID.  Patient was can be discharged to extended care facility once the antibiotics changed to oral by ID.

## 2025-06-12 NOTE — PROGRESS NOTES
Vancomycin Dosing by Pharmacy- FOLLOW UP    Dexter Avalos is a 79 y.o. year old male who Pharmacy has been consulted for vancomycin dosing for cellulitis, skin and soft tissue. Based on the patient's indication and renal status this patient is being dosed based on a goal AUC of 400-600.     Renal function is currently stable.    Current vancomycin dose: 2000 mg given every 24 hours    Estimated vancomycin AUC on current dose: 598 mg/L.hr     Visit Vitals  /67 (BP Location: Right arm, Patient Position: Lying)   Pulse 75   Temp 36.4 °C (97.5 °F) (Temporal)   Resp 17        Lab Results   Component Value Date    CREATININE 1.06 2025    CREATININE 1.11 06/10/2025    CREATININE 1.33 (H) 2025    CREATININE 1.45 (H) 10/12/2024        Patient weight is as follows:   Vitals:    25 0442   Weight: 134 kg (296 lb)       Cultures:  No results found for the encounter in last 14 days.       I/O last 3 completed shifts:  In: 1310 (9.8 mL/kg) [P.O.:1060; IV Piggyback:250]  Out: 7025 (52.3 mL/kg) [Urine:7025 (1.5 mL/kg/hr)]  Weight: 134.3 kg   I/O during current shift:  No intake/output data recorded.    Temp (24hrs), Av.7 °C (98 °F), Min:36.4 °C (97.5 °F), Max:37 °C (98.6 °F)      Assessment/Plan    Within goal AUC range. Continue current vancomycin regimen.    This dosing regimen is predicted by [employer] to result in the following pharmacokinetic parameters:    Loading dose: N/A  Regimen: 2000 mg IV every 24 hours.  Start time: 09:28 on 2025  Exposure target: AUC24 (range) 400-600 mg/L.hr   BSO65-62: 575 mg/L.hr  AUC24,ss: 598 mg/L.hr  Probability of AUC24 > 400: 100 %  Ctrough,ss: 16.9 mg/L  Probability of Ctrough,ss > 20: 14 %    The next level will be obtained on 25 at 0500 hr. May be obtained sooner if clinically indicated.   Will continue to monitor renal function daily while on vancomycin and order serum creatinine at least every 48 hours if not already ordered.  Follow for continued  vancomycin needs, clinical response, and signs/symptoms of toxicity.       Justo Lerma, PharmD

## 2025-06-12 NOTE — PROGRESS NOTES
Spiritual Care Visit  Spiritual Care Request    Reason for Visit:  Routine Visit: Follow-up     Request Received From:       Focus of Care:  Visited With: Patient         Refer to :          Spiritual Care Assessment    Spiritual Assessment:                      Care Provided:       Sense of Community and or [employer] Affiliation:  [employer]   Values/Beliefs  Spiritual Requests During Hospitalization: Dexter asked to have Communion.     Addressed Needs/Concerns and/or Eugenio Through:     Sacramental Encounters  Communion: Patient wants communion  Communion Given Indicator: Yes    Outcome:        Advance Directives:         Spiritual Care Annotation    Annotation:  Dexter asked to have Communion today.  Hamilton Simon

## 2025-06-12 NOTE — PROGRESS NOTES
Vancomycin Dosing by Pharmacy- Cessation of Therapy    Consult to pharmacy for vancomycin dosing has been discontinued by the prescriber, pharmacy will sign off at this time.    Please call pharmacy if there are further questions or re-enter a consult if vancomycin is resumed.     Deon Flannery, PharmD

## 2025-06-12 NOTE — PROGRESS NOTES
Dexter Avalos is a 79 y.o. male on day 3 of admission presenting with Wound of buttock, unspecified laterality, initial encounter.    Subjective     The patient was seen and examined lying in the bed comfortably did not look in acute distress.  Patient was denied any headache or dizziness no nausea or vomiting feeling better as comfortably before.       Objective     Physical Exam  HEENT:  Head externally atraumatic,  extraocular movements intact, oral mucosa moist  Neck:  Supple, no JVP, no palpable adenopathy or thyromegaly.  No carotid bruit.  Chest:  Clear to auscultation and resonant.  Heart:  Regular rate and rhythm, no murmur or gallop could be appreciated.  Abdomen:  Soft,   Obese, non-tender, bowel sounds present, normoactive, no palpable hepatosplenomegaly.  Extremities:   Bilateral mild edema, pulses present, no cyanosis or clubbing. The patient was changes of chronic stasis in the lower extremities.  CNS:  Patient alert, oriented to time, place and person.    No new deficit.  Cranial nerves 2-12 grossly intact  Skin:  No active rash.  Musculoskeletal:  No  apparent joint swelling or erythema, range of movement normal.  Last Recorded Vitals  Heart Rate:  [74-76]   Temp:  [36.6 °C (97.9 °F)-37.1 °C (98.8 °F)]   Resp:  [15-20]   BP: (119-143)/(54-71)   SpO2:  [97 %-99 %]     Intake/Output last 3 Shifts:  I/O last 3 completed shifts:  In: 2390 (17.8 mL/kg) [P.O.:1840; IV Piggyback:550]  Out: 4475 (33.3 mL/kg) [Urine:4475 (0.9 mL/kg/hr)]  Weight: 134.3 kg     Relevant Results  No results found for the last 90 days.    Results for orders placed or performed during the hospital encounter of 06/09/25 (from the past 24 hours)   Vancomycin   Result Value Ref Range    Vancomycin 12.8 5.0 - 20.0 ug/mL   CBC and Auto Differential   Result Value Ref Range    WBC 7.1 4.4 - 11.3 x10*3/uL    nRBC 0.0 0.0 - 0.0 /100 WBCs    RBC 3.17 (L) 4.50 - 5.90 x10*6/uL    Hemoglobin 9.8 (L) 13.5 - 17.5 g/dL    Hematocrit 32.2 (L)  41.0 - 52.0 %     (H) 80 - 100 fL    MCH 30.9 26.0 - 34.0 pg    MCHC 30.4 (L) 32.0 - 36.0 g/dL    RDW 16.6 (H) 11.5 - 14.5 %    Platelets 238 150 - 450 x10*3/uL    Neutrophils % 68.7 40.0 - 80.0 %    Immature Granulocytes %, Automated 2.0 (H) 0.0 - 0.9 %    Lymphocytes % 13.2 13.0 - 44.0 %    Monocytes % 13.0 2.0 - 10.0 %    Eosinophils % 2.7 0.0 - 6.0 %    Basophils % 0.4 0.0 - 2.0 %    Neutrophils Absolute 4.84 1.60 - 5.50 x10*3/uL    Immature Granulocytes Absolute, Automated 0.14 0.00 - 0.50 x10*3/uL    Lymphocytes Absolute 0.93 0.80 - 3.00 x10*3/uL    Monocytes Absolute 0.92 (H) 0.05 - 0.80 x10*3/uL    Eosinophils Absolute 0.19 0.00 - 0.40 x10*3/uL    Basophils Absolute 0.03 0.00 - 0.10 x10*3/uL   Basic Metabolic Panel   Result Value Ref Range    Glucose 89 74 - 99 mg/dL    Sodium 137 136 - 145 mmol/L    Potassium 4.1 3.5 - 5.3 mmol/L    Chloride 110 (H) 98 - 107 mmol/L    Bicarbonate 19 (L) 21 - 32 mmol/L    Anion Gap 12 10 - 20 mmol/L    Urea Nitrogen 26 (H) 6 - 23 mg/dL    Creatinine 1.06 0.50 - 1.30 mg/dL    eGFR 71 >60 mL/min/1.73m*2    Calcium 7.9 (L) 8.6 - 10.3 mg/dL      Current Medications[1]   Assessment/Plan   Assessment & Plan  Wound of buttock, unspecified laterality, initial encounter  Cellulitis of bilateral lower extremity  COPD  Depression  Hypertension  Obstructive sleep apnea  Obesity  Urinary incontinence  BPH    Plan continue current medication.  Supportive care.  The patient was started on broad-spectrum IV antibiotic.  Wound care nurse consult has been obtained.  ID is on consult.  Start nystatin powder for the tenia inguinalis.  We will take DVT, fall, aspiration, decubitus, DVT precaution.  For details see the orders.    Date of service: 06/10/2025    Plan: Continue current medication.  Continued IV antibiotics.  Blood pressure is fairly controlled.  Give supportive care.  Continue nystatin powder.  Monitor renal function panel.  Possible discharge in 2 to 3 days after the  antibiotics changed to oral.  Continue local wound care at this time.  We will continue DVT, fall, aspiration, decubitus, and DVT precaution.    Date of service  06/11/2025     Plan  Continue current medication.  Give supportive care.  Continue with the IV antibiotics patient was improving feeling better as compared to yesterday.  Renal function slightly better.  Patient was hemoglobin hematocrit low but stable.  Continue to monitor at this time.  IV antibiotics per ID.  Patient was can be discharged to extended care facility once the antibiotics changed to oral by ID.               Vinh Tom MD           [1]   Current Facility-Administered Medications:     acetaminophen (Tylenol) tablet 650 mg, 650 mg, oral, q4h PRN **OR** acetaminophen (Tylenol) oral liquid 650 mg, 650 mg, oral, q4h PRN **OR** [DISCONTINUED] acetaminophen (Tylenol) suppository 650 mg, 650 mg, rectal, q4h PRN, Vinh Tom MD    acetaminophen (Tylenol) suppository 325 mg, 325 mg, rectal, q8h PRN, Vinh Tom MD    benzocaine-menthol (Cepastat Sore Throat) lozenge 1 lozenge, 1 lozenge, Mouth/Throat, q2h PRN, Vinh Tom MD    buPROPion SR (Wellbutrin SR) 12 hr tablet 100 mg, 100 mg, oral, BID, Vinh Tom MD, 100 mg at 06/11/25 2107    carvedilol (Coreg) tablet 6.25 mg, 6.25 mg, oral, BID, Vinh Tom MD, 6.25 mg at 06/11/25 2107    cefepime (Maxipime) 2 g in dextrose 5% IV 50 mL, 2 g, intravenous, TID, Vinh Tom MD, Stopped at 06/11/25 2210    cetirizine (ZyrTEC) tablet 10 mg, 10 mg, oral, Daily, Vinh Tom MD, 10 mg at 06/11/25 0846    cholecalciferol (Vitamin D-3) tablet 50 mcg, 50 mcg, oral, Daily, Vinh Tom MD, 50 mcg at 06/11/25 0845    cyanocobalamin (Vitamin B-12) tablet 100 mcg, 100 mcg, oral, Daily, Vinh Tom MD, 100 mcg at 06/11/25 0852    dextromethorphan-guaifenesin (Robitussin DM)  mg/5 mL oral liquid 5 mL, 5 mL, oral, q4h PRN, Vinh SHARPE  MD Vaishali    furosemide (Lasix) tablet 20 mg, 20 mg, oral, TID, Vinh Tom MD, 20 mg at 06/11/25 1629    guaiFENesin (Mucinex) 12 hr tablet 600 mg, 600 mg, oral, q12h PRN, Vinh Tom MD    hydrALAZINE (Apresoline) tablet 25 mg, 25 mg, oral, TID, Vinh Tom MD, 25 mg at 06/11/25 2107    magnesium hydroxide (Milk of Magnesia) 400 mg/5 mL suspension 30 mL, 30 mL, oral, Daily PRN, Vinh Tom MD    nystatin (Mycostatin) 100,000 unit/gram powder 1 Application, 1 Application, Topical, BID, Vinh Tom MD, 1 Application at 06/11/25 2107    ondansetron ODT (Zofran-ODT) disintegrating tablet 4 mg, 4 mg, oral, q8h PRN **OR** ondansetron (Zofran) injection 4 mg, 4 mg, intravenous, q8h PRN, Vinh Tom MD    oxyBUTYnin (Ditropan) tablet 5 mg, 5 mg, oral, BID, Vinh Tom MD, 5 mg at 06/11/25 2107    polyethylene glycol (Glycolax, Miralax) packet 17 g, 17 g, oral, Daily PRN, Vinh Tom MD    polyethylene glycol (Glycolax, Miralax) packet 17 g, 17 g, oral, Daily, Vinh Tom MD, 17 g at 06/11/25 0846    potassium chloride CR (Klor-Con) ER tablet 10 mEq, 10 mEq, oral, Daily, Vinh Tom MD, 10 mEq at 06/11/25 0846    tamsulosin (Flomax) 24 hr capsule 0.4 mg, 0.4 mg, oral, Daily, Vinh Tom MD, 0.4 mg at 06/11/25 0846    vancomycin (Vancocin) pharmacy to dose - pharmacy monitoring, , miscellaneous, Daily PRN, Vinh Tom MD    vancomycin (Xellia) 2,000 mg in diluent combination  mL, 2,000 mg, intravenous, q24h, Justo Lerma, PharmD, Stopped at 06/11/25 1124

## 2025-06-12 NOTE — CARE PLAN
The patient's goals for the shift include IV antibiotics    The clinical goals for the shift include IV antibiotics, monitor I and O's, safety, pain management, and promote rest      06/11/25 at 10:21 PM - Diana Kebede RN

## 2025-06-13 VITALS
SYSTOLIC BLOOD PRESSURE: 147 MMHG | HEIGHT: 70 IN | DIASTOLIC BLOOD PRESSURE: 72 MMHG | BODY MASS INDEX: 42.37 KG/M2 | WEIGHT: 296 LBS | OXYGEN SATURATION: 97 % | RESPIRATION RATE: 18 BRPM | TEMPERATURE: 97.7 F | HEART RATE: 71 BPM

## 2025-06-13 LAB
BACTERIA BLD CULT: NORMAL
BACTERIA BLD CULT: NORMAL
VANCOMYCIN SERPL-MCNC: 18.7 UG/ML (ref 5–20)

## 2025-06-13 PROCEDURE — 2500000002 HC RX 250 W HCPCS SELF ADMINISTERED DRUGS (ALT 637 FOR MEDICARE OP, ALT 636 FOR OP/ED): Performed by: INTERNAL MEDICINE

## 2025-06-13 PROCEDURE — 2500000004 HC RX 250 GENERAL PHARMACY W/ HCPCS (ALT 636 FOR OP/ED): Performed by: NURSE PRACTITIONER

## 2025-06-13 PROCEDURE — 2500000001 HC RX 250 WO HCPCS SELF ADMINISTERED DRUGS (ALT 637 FOR MEDICARE OP): Performed by: INTERNAL MEDICINE

## 2025-06-13 PROCEDURE — 97110 THERAPEUTIC EXERCISES: CPT | Mod: GP,CQ

## 2025-06-13 PROCEDURE — 36415 COLL VENOUS BLD VENIPUNCTURE: CPT | Performed by: INTERNAL MEDICINE

## 2025-06-13 PROCEDURE — 97116 GAIT TRAINING THERAPY: CPT | Mod: GP,CQ

## 2025-06-13 PROCEDURE — 2500000004 HC RX 250 GENERAL PHARMACY W/ HCPCS (ALT 636 FOR OP/ED): Performed by: INTERNAL MEDICINE

## 2025-06-13 PROCEDURE — 80202 ASSAY OF VANCOMYCIN: CPT | Performed by: INTERNAL MEDICINE

## 2025-06-13 RX ORDER — IPRATROPIUM BROMIDE AND ALBUTEROL SULFATE 2.5; .5 MG/3ML; MG/3ML
3 SOLUTION RESPIRATORY (INHALATION) EVERY 8 HOURS PRN
Qty: 180 ML | Refills: 11 | Status: SHIPPED | OUTPATIENT
Start: 2025-06-13

## 2025-06-13 RX ADMIN — CARVEDILOL 6.25 MG: 6.25 TABLET, FILM COATED ORAL at 10:03

## 2025-06-13 RX ADMIN — POTASSIUM CHLORIDE 10 MEQ: 750 TABLET, EXTENDED RELEASE ORAL at 10:03

## 2025-06-13 RX ADMIN — HYDRALAZINE HYDROCHLORIDE 25 MG: 25 TABLET ORAL at 10:03

## 2025-06-13 RX ADMIN — FUROSEMIDE 20 MG: 20 TABLET ORAL at 10:03

## 2025-06-13 RX ADMIN — Medication 50 MCG: at 10:03

## 2025-06-13 RX ADMIN — OXYBUTYNIN CHLORIDE 5 MG: 5 TABLET ORAL at 10:03

## 2025-06-13 RX ADMIN — TAMSULOSIN HYDROCHLORIDE 0.4 MG: 0.4 CAPSULE ORAL at 10:03

## 2025-06-13 RX ADMIN — HYDROXYZINE HYDROCHLORIDE 25 MG: 25 TABLET, FILM COATED ORAL at 00:36

## 2025-06-13 RX ADMIN — VITAM B12 100 MCG: 100 TAB at 10:22

## 2025-06-13 RX ADMIN — HYDROXYZINE HYDROCHLORIDE 25 MG: 25 TABLET, FILM COATED ORAL at 07:09

## 2025-06-13 RX ADMIN — BUPROPION HYDROCHLORIDE 100 MG: 100 TABLET, EXTENDED RELEASE ORAL at 10:06

## 2025-06-13 RX ADMIN — NYSTATIN 1 APPLICATION: 100000 POWDER TOPICAL at 10:04

## 2025-06-13 RX ADMIN — CEFAZOLIN SODIUM 2 G: 2 INJECTION, SOLUTION INTRAVENOUS at 05:26

## 2025-06-13 RX ADMIN — CETIRIZINE HYDROCHLORIDE 10 MG: 10 TABLET, FILM COATED ORAL at 10:03

## 2025-06-13 ASSESSMENT — PAIN - FUNCTIONAL ASSESSMENT
PAIN_FUNCTIONAL_ASSESSMENT: 0-10

## 2025-06-13 ASSESSMENT — COGNITIVE AND FUNCTIONAL STATUS - GENERAL
CLIMB 3 TO 5 STEPS WITH RAILING: A LOT
TURNING FROM BACK TO SIDE WHILE IN FLAT BAD: A LOT
MOVING TO AND FROM BED TO CHAIR: A LOT
TOILETING: A LOT
TURNING FROM BACK TO SIDE WHILE IN FLAT BAD: A LOT
DRESSING REGULAR LOWER BODY CLOTHING: A LOT
MOVING FROM LYING ON BACK TO SITTING ON SIDE OF FLAT BED WITH BEDRAILS: A LOT
MOBILITY SCORE: 12
MOBILITY SCORE: 13
DRESSING REGULAR UPPER BODY CLOTHING: A LOT
STANDING UP FROM CHAIR USING ARMS: A LITTLE
MOVING TO AND FROM BED TO CHAIR: A LITTLE
EATING MEALS: A LOT
WALKING IN HOSPITAL ROOM: A LOT
MOVING FROM LYING ON BACK TO SITTING ON SIDE OF FLAT BED WITH BEDRAILS: A LOT
CLIMB 3 TO 5 STEPS WITH RAILING: TOTAL
WALKING IN HOSPITAL ROOM: A LOT
HELP NEEDED FOR BATHING: A LOT
DAILY ACTIVITIY SCORE: 12
PERSONAL GROOMING: A LOT
STANDING UP FROM CHAIR USING ARMS: A LOT

## 2025-06-13 ASSESSMENT — PAIN SCALES - GENERAL
PAINLEVEL_OUTOF10: 1
PAINLEVEL_OUTOF10: 1
PAINLEVEL_OUTOF10: 0 - NO PAIN

## 2025-06-13 NOTE — DISCHARGE SUMMARY
Discharge Diagnosis  Wound of buttock, unspecified laterality, initial encounter           Issues Requiring Follow-Up  Cellulitis    Discharge Meds     Medication List      START taking these medications     cephalexin 500 mg capsule; Commonly known as: Keflex; Take 1 capsule   (500 mg) by mouth every 6 hours for 6 days.   ipratropium-albuteroL 0.5-2.5 mg/3 mL nebulizer solution; Commonly known   as: Duo-Neb; Take 3 mL by nebulization every 8 hours if needed for   wheezing.     CONTINUE taking these medications     acetaminophen 325 mg suppository; Commonly known as: Tylenol   buPROPion  mg 12 hr tablet; Commonly known as: Wellbutrin SR   carvedilol 6.25 mg tablet; Commonly known as: Coreg   cholecalciferol 25 mcg (1,000 units) capsule; Commonly known as: Vitamin   D-3   cyanocobalamin 100 mcg tablet; Commonly known as: Vitamin B-12   furosemide 20 mg tablet; Commonly known as: Lasix   hydrALAZINE 25 mg tablet; Commonly known as: Apresoline   loratadine 10 mg disintegrating tablet; Commonly known as: Claritin   Reditabs   magnesium hydroxide 400 mg/5 mL suspension; Commonly known as: Milk of   Magnesia   nystatin 100,000 unit/gram powder; Commonly known as: Mycostatin   ondansetron 8 mg tablet; Commonly known as: Zofran   oxyBUTYnin XL 10 mg 24 hr tablet; Commonly known as: Ditropan-XL   polyethylene glycol 17 gram packet; Commonly known as: Glycolax, Miralax   potassium chloride CR 10 mEq ER tablet; Commonly known as: Klor-Con   tamsulosin 0.4 mg 24 hr capsule; Commonly known as: Flomax       Test Results Pending At Discharge  Pending Labs       Order Current Status    Extra Tubes In process    Lavender Top In process            Hospital Course   The patient was admitted with complaint of cellulitis of renal area.  The patient was treated with a course of IV antibiotic.  ID consult was obtained.  Patient was initially given meropenem and vancomycin.  Switched over to cefazolin and then Keflex.  The patient is  being discharged back to  The care facility on oral antibiotics.  Pertinent Physical Exam At Time of Discharge  Physical Exam    Outpatient Follow-Up  No future appointments.      Vinh Tom MD

## 2025-06-13 NOTE — PROGRESS NOTES
Dexter Avalos is a 79 y.o. male on day 3 of admission presenting with Wound of buttock, unspecified laterality, initial encounter.    Subjective   Patient was seen and examined morbidly obese.  Lying in the bed.  Comfortable.  Not in acute distress.       Objective     Physical Exam  HEENT:  Head externally atraumatic,  extraocular movements intact, oral mucosa moist  Neck:  Supple, no JVP, no palpable adenopathy or thyromegaly.  No carotid bruit.  Chest:  Clear to auscultation and resonant.  Heart:  Regular rate and rhythm, no murmur or gallop could be appreciated.  Abdomen:  Soft, nontender, bowel sounds present, normoactive, no palpable hepatosplenomegaly.  Extremities: Cellulitis of lower extremity present   CNS:  Patient alert, oriented to time, place and person.    No new deficit.  Cranial nerves 2-12 grossly intact  Skin: Edema and erythema of the lower extremities  Musculoskeletal:  No  apparent joint swelling or erythema, range of movement normal.  Last Recorded Vitals  Heart Rate:  [72-75]   Temp:  [36.4 °C (97.5 °F)-36.6 °C (97.9 °F)]   Resp:  [15-17]   BP: (131-147)/(65-75)   SpO2:  [97 %-98 %]     Intake/Output last 3 Shifts:  I/O last 3 completed shifts:  In: 1210 (9 mL/kg) [P.O.:1110; IV Piggyback:100]  Out: 4975 (37.1 mL/kg) [Urine:4975 (1 mL/kg/hr)]  Weight: 134.3 kg     Relevant Results  No results found for the last 90 days.    No results found for this or any previous visit (from the past 24 hours).   Current Medications[1]   Assessment/Plan   Assessment & Plan  Wound of buttock, unspecified laterality, initial encounter  Cellulitis of bilateral lower extremity  COPD  Depression  Hypertension  Obstructive sleep apnea  Obesity  Urinary incontinence  BPH    Plan continue current medication.  Supportive care.  The patient was started on broad-spectrum IV antibiotic.  Wound care nurse consult has been obtained.  ID is on consult.  Start nystatin powder for the tenia inguinalis.  We will take DVT,  fall, aspiration, decubitus, DVT precaution.  For details see the orders.    Date of service: 06/10/2025    Plan: Continue current medication.  Continued IV antibiotics.  Blood pressure is fairly controlled.  Give supportive care.  Continue nystatin powder.  Monitor renal function panel.  Possible discharge in 2 to 3 days after the antibiotics changed to oral.  Continue local wound care at this time.  We will continue DVT, fall, aspiration, decubitus, and DVT precaution.    Date of service  06/11/2025     Plan  Continue current medication.  Give supportive care.  Continue with the IV antibiotics patient was improving feeling better as compared to yesterday.  Renal function slightly better.  Patient was hemoglobin hematocrit low but stable.  Continue to monitor at this time.  IV antibiotics per ID.  Patient was can be discharged to extended care facility once the antibiotics changed to oral by ID.    Date of service: 6/12/2025    Plan: Continue current medications.  Give supportive care.  Give physical therapy and Occupational Therapy.  Continue IV antibiotics at this time per ID.  Patient said he has asthma and takes as needed aerosol treatments.  Reorder aerosol treatments.  Possible discharge soon.           Vinh Tom MD            [1]   Current Facility-Administered Medications:     acetaminophen (Tylenol) tablet 650 mg, 650 mg, oral, q4h PRN **OR** acetaminophen (Tylenol) oral liquid 650 mg, 650 mg, oral, q4h PRN **OR** [DISCONTINUED] acetaminophen (Tylenol) suppository 650 mg, 650 mg, rectal, q4h PRN, Vinh Tom MD    acetaminophen (Tylenol) suppository 325 mg, 325 mg, rectal, q8h PRN, Vinh Tom MD    benzocaine-menthol (Cepastat Sore Throat) lozenge 1 lozenge, 1 lozenge, Mouth/Throat, q2h PRN, Vinh Tom MD    buPROPion SR (Wellbutrin SR) 12 hr tablet 100 mg, 100 mg, oral, BID, Vinh Tom MD, 100 mg at 06/12/25 0807    carvedilol (Coreg) tablet 6.25 mg, 6.25 mg,  oral, BID, Vinh Tom MD, 6.25 mg at 06/12/25 0806    ceFAZolin (Ancef) 2 g in dextrose (iso)  mL, 2 g, intravenous, q8h, Yisel Lui, APRN-CNP, Stopped at 06/12/25 1338    cetirizine (ZyrTEC) tablet 10 mg, 10 mg, oral, Daily, Vinh Tom MD, 10 mg at 06/12/25 0806    cholecalciferol (Vitamin D-3) tablet 50 mcg, 50 mcg, oral, Daily, Vinh Tom MD, 50 mcg at 06/12/25 0806    cyanocobalamin (Vitamin B-12) tablet 100 mcg, 100 mcg, oral, Daily, Vinh Tom MD, 100 mcg at 06/11/25 0852    dextromethorphan-guaifenesin (Robitussin DM)  mg/5 mL oral liquid 5 mL, 5 mL, oral, q4h PRN, Vinh Tom MD    furosemide (Lasix) tablet 20 mg, 20 mg, oral, TID, Vinh Tom MD, 20 mg at 06/12/25 1630    guaiFENesin (Mucinex) 12 hr tablet 600 mg, 600 mg, oral, q12h PRN, Vinh Tom MD    hydrALAZINE (Apresoline) tablet 25 mg, 25 mg, oral, TID, Vinh Tom MD, 25 mg at 06/12/25 1521    magnesium hydroxide (Milk of Magnesia) 400 mg/5 mL suspension 30 mL, 30 mL, oral, Daily PRN, Vinh Tom MD    nystatin (Mycostatin) 100,000 unit/gram powder 1 Application, 1 Application, Topical, BID, Vinh Tom MD, 1 Application at 06/12/25 0807    ondansetron ODT (Zofran-ODT) disintegrating tablet 4 mg, 4 mg, oral, q8h PRN **OR** ondansetron (Zofran) injection 4 mg, 4 mg, intravenous, q8h PRN, Vinh Tom MD    oxyBUTYnin (Ditropan) tablet 5 mg, 5 mg, oral, BID, Vinh Tom MD, 5 mg at 06/12/25 0806    polyethylene glycol (Glycolax, Miralax) packet 17 g, 17 g, oral, Daily PRN, Vinh Tom MD    polyethylene glycol (Glycolax, Miralax) packet 17 g, 17 g, oral, Daily, Vinh Tom MD, 17 g at 06/12/25 0807    potassium chloride CR (Klor-Con) ER tablet 10 mEq, 10 mEq, oral, Daily, Vinh Tom MD, 10 mEq at 06/12/25 0806    tamsulosin (Flomax) 24 hr capsule 0.4 mg, 0.4 mg, oral, Daily, Vinh Tom MD, 0.4 mg at  06/12/25 0806

## 2025-06-13 NOTE — PROGRESS NOTES
Physical Therapy    Physical Therapy Treatment    Patient Name: Dexter Avalos  MRN: [id number]  Department: [address]  Room: [address] [place] [address]  Today's Date: 6/13/2025  Time Calculation  Start Time: 0815  Stop Time: 0840  Time Calculation (min): 25 min         Assessment/Plan   PT Assessment  PT Assessment Results: Decreased strength, Decreased endurance, Impaired balance, Decreased mobility, Decreased coordination, Decreased cognition, Impaired judgement, Decreased safety awareness, Obesity, Decreased skin integrity, Pain  Rehab Prognosis: Fair  Barriers to Discharge Home: Physical needs  Physical Needs: 24hr mobility assistance needed, 24hr ADL assistance needed, Returning to long-term care/other facility  Evaluation/Treatment Tolerance: Patient tolerated treatment well  Medical Staff Made Aware: Yes  Strengths: Ability to acquire knowledge  Barriers to Participation: Comorbidities  End of Session Communication: Bedside nurse  Assessment Comment: Increased gait distance to with Min A x 2 to steady. Demonstrates weakness. Continue PT service to improved safe funtional mobility  End of Session Patient Position: Up in chair, Alarm on (Needs at reach)     PT Plan  Treatment/Interventions: Bed mobility, Transfer training, Gait training, Balance training, Neuromuscular re-education, Strengthening, Range of motion, Therapeutic exercise, Therapeutic activity, Home exercise program, Positioning, Postural re-education  PT Plan: Ongoing PT  PT Frequency: 3 times per week  PT Discharge Recommendations: Low intensity level of continued care  Equipment Recommended upon Discharge: Wheeled walker  PT Recommended Transfer Status: Assistive device, Assist x1  PT - OK to Discharge: Yes    PT Visit Info:  PT Received On: 06/13/25     General Visit Information:   General  Reason for Referral: impaired mobility d/t wound buttocks  Referred By: Dr. Tom  Past Medical History Relevant to Rehab: Anxiety  Cellulitis and abscess of lower extremity   COPD (chronic obstructive pulmonary disease) (Multi)  Depression  Hypertension  Obesity  DANELLE (obstructive sleep apnea)  Family/Caregiver Present: No  Prior to Session Communication: Bedside nurse  Patient Position Received: Bed, 3 rail up, Alarm off, not on at start of session  Preferred Learning Style: visual, verbal  General Comment: Cleared by nursing and agreeable for therapy.    Subjective   Precautions:  Precautions  Medical Precautions: Fall precautions            Objective   Pain:  Pain Assessment  Pain Assessment: 0-10  0-10 (Numeric) Pain Score: 1  Pain Location: Generalized  Cognition:  Cognition  Overall Cognitive Status: Within Functional Limits  Orientation Level: Oriented X4  Cognition Comments: Pleasant and cooperative  Safety/Judgement:  (decreased safety awareness)  Insight: Mild  Impulsive: Mildly  Processing Speed: Delayed  Coordination:     Postural Control:     Extremity/Trunk Assessments:    Activity Tolerance:  Activity Tolerance  Endurance: Decreased tolerance for upright activites  Treatments:  Therapeutic Exercise  Therapeutic Exercise Performed: Yes  Therapeutic Exercise Activity 1: B LE reclining ther ex: ankle pumps,quad/gluteal sets, heelslides, SAQs, hip abduction/adduction x 10    Bed Mobility  Bed Mobility: Yes  Bed Mobility 1  Bed Mobility 1: Supine to sitting  Level of Assistance 1: Moderate assistance  Bed Mobility Comments 1: Heavy assist for trunk up Effortful to complete scooting to EOB    Ambulation/Gait Training  Ambulation/Gait Training Performed: Yes  Ambulation/Gait Training 1  Surface 1: Level tile  Device 1: Rolling walker  Assistance 1: Contact guard  Comments/Distance (ft) 1: Able to ambulate 7' from bed to recliner with contact guard assist with slow shuffling steps  Transfers  Transfer: Yes  Transfer 1  Transfer From 1: Bed to  Transfer to 1: Stand  Technique 1: Sit to stand  Transfer Device 1: Walker  Transfer Level of Assistance 1: Minimum assistance,  +2  Trials/Comments 1: STS from elevated bed with Min A x 2 to steady  Transfers 2  Transfer From 2: Stand to  Transfer to 2: Sit, Chair with arms  Technique 2: Stand to sit  Transfer Device 2: Walker  Transfer Level of Assistance 2: Minimum assistance  Trials/Comments 2: STS to recliner with Min A with cues for safe hand placement and slow descend into chair    Outcome Measures:  [facility] Basic Mobility  Turning from your back to your side while in a flat bed without using bedrails: A lot  Moving from lying on your back to sitting on the side of a flat bed without using bedrails: A lot  Moving to and from bed to chair (including a wheelchair): A little  Standing up from a chair using your arms (e.g. wheelchair or bedside chair): A little  To walk in hospital room: A lot  Climbing 3-5 steps with railing: Total  Basic Mobility - Total Score: 13    Education Documentation  Home Exercise Program, taught by Tory Murphy PTA at 6/13/2025 11:02 AM.  Learner: Patient  Readiness: Acceptance  Method: Explanation  Response: Verbalizes Understanding, Needs Reinforcement  Comment: Min A x 2 STS cues for safe hand placement    Mobility Training, taught by Tory Murphy PTA at 6/13/2025 11:02 AM.  Learner: Patient  Readiness: Acceptance  Method: Explanation  Response: Verbalizes Understanding, Needs Reinforcement  Comment: Min A x 2 STS cues for safe hand placement    Education Comments  No comments found.        OP EDUCATION:       Encounter Problems       Encounter Problems (Active)       Balance       Sitting Balance (Progressing)       Start:  06/09/25    Expected End:  06/23/25       Pt will demonstrate good sitting balance to promote safe engagement with out of bed activities           Standing Balance (Progressing)       Start:  06/09/25    Expected End:  06/23/25       Pt will demonstrate good static standing balance to promote safe participation with out of bed activity, transfers, and mobility               Mobility       Ambulation (Progressing)       Start:  06/09/25    Expected End:  06/23/25       Pt will ambulate 50' modified independent assist with walker to promote safe home mobility              PT Transfers       Supine to sit (Progressing)       Start:  06/09/25    Expected End:  06/23/25       Pt will transfer supine to sitting at edge of bed with modified independent assist to promote acute care out of bed activity           Sit to stand (Progressing)       Start:  06/09/25    Expected End:  06/23/25       Pt will transfer sit to standing position with modified independent assist and walker to promote safe out of bed activity           Bed to chair (Progressing)       Start:  06/09/25    Expected End:  06/23/25       Pt will transfer from sitting edge of bed to the chair with modified independent assist and walker to promote out of bed activity and reduce the risks of prolonged acute care bedrest              Pain - Adult          Safety       Safe Mobility Techniques (Progressing)       Start:  06/09/25    Expected End:  06/23/25       Pt will correctly identify and demonstrate safe mobility techniques to reduce their risks for falls during their acute care stay

## 2025-06-13 NOTE — CARE PLAN
The patient's goals for the shift include IV antibiotics    The clinical goals for the shift include IV antibiotic, remain free from falls      Problem: Pain - Adult  Goal: Verbalizes/displays adequate comfort level or baseline comfort level  Outcome: Progressing     Problem: Safety - Adult  Goal: Free from fall injury  Outcome: Progressing     Problem: Discharge Planning  Goal: Discharge to home or other facility with appropriate resources  Outcome: Progressing     Problem: Chronic Conditions and Co-morbidities  Goal: Patient's chronic conditions and co-morbidity symptoms are monitored and maintained or improved  Outcome: Progressing     Problem: Nutrition  Goal: Nutrient intake appropriate for maintaining nutritional needs  Outcome: Progressing     Problem: Skin  Goal: Decreased wound size/increased tissue granulation at next dressing change  Outcome: Progressing  Flowsheets (Taken 6/12/2025 2036)  Decreased wound size/increased tissue granulation at next dressing change: Protective dressings over bony prominences  Goal: Participates in plan/prevention/treatment measures  Outcome: Progressing  Flowsheets (Taken 6/12/2025 2036)  Participates in plan/prevention/treatment measures: Elevate heels  Goal: Prevent/manage excess moisture  Outcome: Progressing  Flowsheets (Taken 6/12/2025 2036)  Prevent/manage excess moisture: Cleanse incontinence/protect with barrier cream  Goal: Prevent/minimize sheer/friction injuries  Outcome: Progressing  Flowsheets (Taken 6/12/2025 2036)  Prevent/minimize sheer/friction injuries:   HOB 30 degrees or less   Use pull sheet   Increase activity/out of bed for meals  Goal: Promote/optimize nutrition  Outcome: Progressing  Flowsheets (Taken 6/12/2025 2036)  Promote/optimize nutrition:   Monitor/record intake including meals   Consume > 50% meals/supplements  Goal: Promote skin healing  Outcome: Progressing  Flowsheets (Taken 6/12/2025 2036)  Promote skin healing: Protective dressings over  bony prominences

## 2025-06-13 NOTE — CARE PLAN
Problem: Pain - Adult  Goal: Verbalizes/displays adequate comfort level or baseline comfort level  6/13/2025 0815 by Rick Leon RN  Outcome: Progressing  6/13/2025 0815 by Rick Leon RN  Outcome: Progressing     Problem: Safety - Adult  Goal: Free from fall injury  6/13/2025 0815 by Rick Leon RN  Outcome: Progressing  6/13/2025 0815 by Rick Leon RN  Outcome: Progressing     Problem: Discharge Planning  Goal: Discharge to home or other facility with appropriate resources  6/13/2025 0815 by Rick Leon RN  Outcome: Progressing  6/13/2025 0815 by Rick Leon RN  Outcome: Progressing     Problem: Chronic Conditions and Co-morbidities  Goal: Patient's chronic conditions and co-morbidity symptoms are monitored and maintained or improved  6/13/2025 0815 by Rick Leon RN  Outcome: Progressing  6/13/2025 0815 by Rick Leon RN  Outcome: Progressing     Problem: Nutrition  Goal: Nutrient intake appropriate for maintaining nutritional needs  6/13/2025 0815 by Rick Leon RN  Outcome: Progressing  6/13/2025 0815 by Rick Leon RN  Outcome: Progressing     Problem: Skin  Goal: Decreased wound size/increased tissue granulation at next dressing change  6/13/2025 0815 by Rick Leon RN  Outcome: Progressing  6/13/2025 0815 by Rick Leon RN  Outcome: Progressing  Goal: Participates in plan/prevention/treatment measures  6/13/2025 0815 by Rick Leon RN  Outcome: Progressing  6/13/2025 0815 by Rick Leon RN  Outcome: Progressing  Goal: Prevent/manage excess moisture  6/13/2025 0815 by Rick Leon RN  Outcome: Progressing  6/13/2025 0815 by Rick Leon RN  Outcome: Progressing  Goal: Prevent/minimize sheer/friction injuries  6/13/2025 0815 by Rick Leon RN  Outcome: Progressing  6/13/2025 0815 by Rick Leon RN  Outcome: Progressing  Goal: Promote/optimize nutrition  6/13/2025 0815 by Rick Leon RN  Outcome:  Progressing  6/13/2025 0815 by Rick Leon RN  Outcome: Progressing  Goal: Promote skin healing  6/13/2025 0815 by Rick Leon RN  Outcome: Progressing  6/13/2025 0815 by Rick Leon RN  Outcome: Progressing     Problem: Fall/Injury  Goal: Not fall by end of shift  6/13/2025 0815 by Rick Leon RN  Outcome: Progressing  6/13/2025 0815 by Rick Leon RN  Outcome: Progressing  Goal: Be free from injury by end of the shift  6/13/2025 0815 by Rick Leon RN  Outcome: Progressing  6/13/2025 0815 by Rick Leon RN  Outcome: Progressing  Goal: Verbalize understanding of personal risk factors for fall in the hospital  6/13/2025 0815 by Rick Leon RN  Outcome: Progressing  6/13/2025 0815 by Rick Leon RN  Outcome: Progressing  Goal: Verbalize understanding of risk factor reduction measures to prevent injury from fall in the home  6/13/2025 0815 by Rick Leon RN  Outcome: Progressing  6/13/2025 0815 by Rick Leon RN  Outcome: Progressing  Goal: Use assistive devices by end of the shift  6/13/2025 0815 by Rick Leon RN  Outcome: Progressing  6/13/2025 0815 by Rick Leon RN  Outcome: Progressing  Goal: Pace activities to prevent fatigue by end of the shift  6/13/2025 0815 by Rick Leon RN  Outcome: Progressing  6/13/2025 0815 by Rick Leon RN  Outcome: Progressing   The patient's goals for the shift include IV antibiotics    The clinical goals for the shift include IV antibiotic, remain free from falls

## 2025-06-13 NOTE — ASSESSMENT & PLAN NOTE
Cellulitis of bilateral lower extremity  COPD  Depression  Hypertension  Obstructive sleep apnea  Obesity  Urinary incontinence  BPH    Plan continue current medication.  Supportive care.  The patient was started on broad-spectrum IV antibiotic.  Wound care nurse consult has been obtained.  ID is on consult.  Start nystatin powder for the tenia inguinalis.  We will take DVT, fall, aspiration, decubitus, DVT precaution.  For details see the orders.    Date of service: 06/10/2025    Plan: Continue current medication.  Continued IV antibiotics.  Blood pressure is fairly controlled.  Give supportive care.  Continue nystatin powder.  Monitor renal function panel.  Possible discharge in 2 to 3 days after the antibiotics changed to oral.  Continue local wound care at this time.  We will continue DVT, fall, aspiration, decubitus, and DVT precaution.    Date of service  06/11/2025     Plan  Continue current medication.  Give supportive care.  Continue with the IV antibiotics patient was improving feeling better as compared to yesterday.  Renal function slightly better.  Patient was hemoglobin hematocrit low but stable.  Continue to monitor at this time.  IV antibiotics per ID.  Patient was can be discharged to extended care facility once the antibiotics changed to oral by ID.    Date of service: 6/12/2025    Plan: Continue current medications.  Give supportive care.  Give physical therapy and Occupational Therapy.  Continue IV antibiotics at this time per ID.  Patient said he has asthma and takes as needed aerosol treatments.  Reorder aerosol treatments.  Possible discharge soon.

## 2025-06-13 NOTE — PROGRESS NOTES
06/13/25 1127   Discharge Planning   Expected Discharge Disposition Inter  ([facility])   Does the patient need discharge transport arranged? Yes   RoundTrip coordination needed? Yes   Has discharge transport been arranged? Yes   What day is the transport expected? 06/13/25     Transport arranged back to [facility]  By karina

## 2025-06-13 NOTE — PROGRESS NOTES
Dexter Avalos is a 79 y.o. male on day 4 of admission presenting with Wound of buttock, unspecified laterality, initial encounter.    Subjective   The patient was seen and examined.  He was comfortably sitting in the chair.  He denied any headache or dizziness.       Objective     Physical Exam  HEENT:  Head externally atraumatic,  extraocular movements intact, oral mucosa moist  Neck:  Supple, no JVP, no palpable adenopathy or thyromegaly.  No carotid bruit.  Chest:  Clear to auscultation and resonant.  Heart:  Regular rate and rhythm, no murmur or gallop could be appreciated.  Abdomen:  Soft, nontender, bowel sounds present, normoactive, no palpable hepatosplenomegaly.  Extremities:  No edema, pulses present, no cyanosis or clubbing.  CNS:  Patient alert, oriented to time, place and person.    No new deficit.  Cranial nerves 2-12 grossly intact  Skin:  No active rash.  Musculoskeletal:  No  apparent joint swelling or erythema, range of movement normal.  Last Recorded Vitals  Heart Rate:  [71-77]   Temp:  [36.5 °C (97.7 °F)-37 °C (98.6 °F)]   Resp:  [18]   BP: (138-157)/(66-72)   SpO2:  [97 %-98 %]     Intake/Output last 3 Shifts:  I/O last 3 completed shifts:  In: 1265 (9.4 mL/kg) [P.O.:1065; IV Piggyback:200]  Out: 5200 (38.7 mL/kg) [Urine:5200 (1.1 mL/kg/hr)]  Weight: 134.3 kg     Relevant Results  No results found for the last 90 days.    Results for orders placed or performed during the hospital encounter of 06/09/25 (from the past 24 hours)   Vancomycin   Result Value Ref Range    Vancomycin 18.7 5.0 - 20.0 ug/mL      Current Medications[1]   Assessment/Plan   Assessment & Plan  Wound of buttock, unspecified laterality, initial encounter  Cellulitis of bilateral lower extremity  COPD  Depression  Hypertension  Obstructive sleep apnea  Obesity  Urinary incontinence  BPH    Plan continue current medication.  Supportive care.  The patient was started on broad-spectrum IV antibiotic.  Wound care nurse consult  has been obtained.  ID is on consult.  Start nystatin powder for the tenia inguinalis.  We will take DVT, fall, aspiration, decubitus, DVT precaution.  For details see the orders.    Date of service: 06/10/2025    Plan: Continue current medication.  Continued IV antibiotics.  Blood pressure is fairly controlled.  Give supportive care.  Continue nystatin powder.  Monitor renal function panel.  Possible discharge in 2 to 3 days after the antibiotics changed to oral.  Continue local wound care at this time.  We will continue DVT, fall, aspiration, decubitus, and DVT precaution.    Date of service  06/11/2025     Plan  Continue current medication.  Give supportive care.  Continue with the IV antibiotics patient was improving feeling better as compared to yesterday.  Renal function slightly better.  Patient was hemoglobin hematocrit low but stable.  Continue to monitor at this time.  IV antibiotics per ID.  Patient was can be discharged to extended care facility once the antibiotics changed to oral by ID.    Date of service: 6/12/2025    Plan: Continue current medications.  Give supportive care.  Give physical therapy and Occupational Therapy.  Continue IV antibiotics at this time per ID.  Patient said he has asthma and takes as needed aerosol treatments.  Reorder aerosol treatments.  Possible discharge soon.    Date of service: 6/13/2025        Continue current mission patient stable.  Antibiotic has been changed to oral.  The patient is medically stable for discharge.  The patient will be discharged with a Manning catheter in order to give voiding trial and 5 to 6 days.           Vinh Tom MD           [1] No current facility-administered medications for this encounter.    Current Outpatient Medications:     acetaminophen (Tylenol) 325 mg suppository, Insert 1 suppository (325 mg) into the rectum every 8 hours if needed for mild pain (1 - 3) or fever (temp greater than 38.0 C)., Disp: , Rfl:     buPROPion SR  (Wellbutrin SR) 100 mg 12 hr tablet, Take 1 tablet (100 mg) by mouth 2 times a day. Do not crush, chew, or split., Disp: , Rfl:     carvedilol (Coreg) 6.25 mg tablet, Take 1 tablet (6.25 mg) by mouth 2 times a day., Disp: , Rfl:     cephalexin (Keflex) 500 mg capsule, Take 1 capsule (500 mg) by mouth every 6 hours for 6 days., Disp: , Rfl:     cholecalciferol (Vitamin D-3) 25 mcg (1,000 units) capsule, Take 2 capsules (50 mcg) by mouth once daily., Disp: , Rfl:     cyanocobalamin (Vitamin B-12) 100 mcg tablet, Take 1 tablet (100 mcg) by mouth once daily., Disp: , Rfl:     furosemide (Lasix) 20 mg tablet, Take 1 tablet (20 mg) by mouth 3 times daily (morning, midday, late afternoon)., Disp: , Rfl:     hydrALAZINE (Apresoline) 25 mg tablet, Take 1 tablet (25 mg) by mouth 3 times a day., Disp: , Rfl:     ipratropium-albuteroL (Duo-Neb) 0.5-2.5 mg/3 mL nebulizer solution, Take 3 mL by nebulization every 8 hours if needed for wheezing., Disp: 180 mL, Rfl: 11    loratadine (Claritin Reditabs) 10 mg disintegrating tablet, Dissolve 1 tablet (10 mg) in the mouth once daily., Disp: , Rfl:     magnesium hydroxide (Milk of Magnesia) 400 mg/5 mL suspension, Take 30 mL by mouth once daily as needed for constipation., Disp: , Rfl:     nystatin (Mycostatin) 100,000 unit/gram powder, Apply 1 Application topically 2 times a day., Disp: , Rfl:     ondansetron (Zofran) 8 mg tablet, Take 1 tablet (8 mg) by mouth every 8 hours if needed for nausea or vomiting., Disp: , Rfl:     oxyBUTYnin XL (Ditropan-XL) 10 mg 24 hr tablet, Take 1 tablet (10 mg) by mouth once daily. Do not crush, chew, or split. Take at same time each day., Disp: , Rfl:     polyethylene glycol (Glycolax, Miralax) 17 gram packet, Take 17 g by mouth once daily., Disp: , Rfl:     potassium chloride CR 10 mEq ER tablet, Take 1 tablet (10 mEq) by mouth once daily. Do not crush, chew, or split., Disp: , Rfl:     tamsulosin (Flomax) 0.4 mg 24 hr capsule, Take 1 capsule (0.4  mg) by mouth once daily. Do not crush, chew, or split., Disp: , Rfl:

## 2025-06-13 NOTE — ASSESSMENT & PLAN NOTE
Cellulitis of bilateral lower extremity  COPD  Depression  Hypertension  Obstructive sleep apnea  Obesity  Urinary incontinence  BPH    Plan continue current medication.  Supportive care.  The patient was started on broad-spectrum IV antibiotic.  Wound care nurse consult has been obtained.  ID is on consult.  Start nystatin powder for the tenia inguinalis.  We will take DVT, fall, aspiration, decubitus, DVT precaution.  For details see the orders.    Date of service: 06/10/2025    Plan: Continue current medication.  Continued IV antibiotics.  Blood pressure is fairly controlled.  Give supportive care.  Continue nystatin powder.  Monitor renal function panel.  Possible discharge in 2 to 3 days after the antibiotics changed to oral.  Continue local wound care at this time.  We will continue DVT, fall, aspiration, decubitus, and DVT precaution.    Date of service  06/11/2025     Plan  Continue current medication.  Give supportive care.  Continue with the IV antibiotics patient was improving feeling better as compared to yesterday.  Renal function slightly better.  Patient was hemoglobin hematocrit low but stable.  Continue to monitor at this time.  IV antibiotics per ID.  Patient was can be discharged to extended care facility once the antibiotics changed to oral by ID.    Date of service: 6/12/2025    Plan: Continue current medications.  Give supportive care.  Give physical therapy and Occupational Therapy.  Continue IV antibiotics at this time per ID.  Patient said he has asthma and takes as needed aerosol treatments.  Reorder aerosol treatments.  Possible discharge soon.    Date of service: 6/13/2025        Continue current mission patient stable.  Antibiotic has been changed to oral.  The patient is medically stable for discharge.  The patient will be discharged with a Manning catheter in order to give voiding trial and 5 to 6 days.

## 2025-06-13 NOTE — CARE PLAN
Problem: Pain - Adult  Goal: Verbalizes/displays adequate comfort level or baseline comfort level  Outcome: Progressing     Problem: Safety - Adult  Goal: Free from fall injury  Outcome: Progressing     Problem: Discharge Planning  Goal: Discharge to home or other facility with appropriate resources  Outcome: Progressing     Problem: Chronic Conditions and Co-morbidities  Goal: Patient's chronic conditions and co-morbidity symptoms are monitored and maintained or improved  Outcome: Progressing     Problem: Nutrition  Goal: Nutrient intake appropriate for maintaining nutritional needs  Outcome: Progressing     Problem: Skin  Goal: Decreased wound size/increased tissue granulation at next dressing change  Outcome: Progressing  Goal: Participates in plan/prevention/treatment measures  Outcome: Progressing  Goal: Prevent/manage excess moisture  Outcome: Progressing  Goal: Prevent/minimize sheer/friction injuries  Outcome: Progressing  Goal: Promote/optimize nutrition  Outcome: Progressing  Goal: Promote skin healing  Outcome: Progressing     Problem: Fall/Injury  Goal: Not fall by end of shift  Outcome: Progressing  Goal: Be free from injury by end of the shift  Outcome: Progressing  Goal: Verbalize understanding of personal risk factors for fall in the hospital  Outcome: Progressing  Goal: Verbalize understanding of risk factor reduction measures to prevent injury from fall in the home  Outcome: Progressing  Goal: Use assistive devices by end of the shift  Outcome: Progressing  Goal: Pace activities to prevent fatigue by end of the shift  Outcome: Progressing   The patient's goals for the shift include IV antibiotics    The clinical goals for the shift include IV antibiotic, remain free from falls

## 2025-06-13 NOTE — NURSING NOTE
Patient c/o itching to his back, legs and abdominal folds. Abdominal folds cleansed with soap and water, patted dry and Nystatin powder applied. Dr Tom notified about itching and he gave phone order for Hydroxyzine prn.

## 2025-06-16 LAB — HOLD SPECIMEN: NORMAL
